# Patient Record
Sex: MALE | Race: BLACK OR AFRICAN AMERICAN | NOT HISPANIC OR LATINO | URBAN - METROPOLITAN AREA
[De-identification: names, ages, dates, MRNs, and addresses within clinical notes are randomized per-mention and may not be internally consistent; named-entity substitution may affect disease eponyms.]

---

## 2019-07-13 ENCOUNTER — OUTPATIENT (OUTPATIENT)
Dept: OUTPATIENT SERVICES | Facility: HOSPITAL | Age: 64
LOS: 1 days | Discharge: HOME | End: 2019-07-13

## 2019-07-13 DIAGNOSIS — N39.0 URINARY TRACT INFECTION, SITE NOT SPECIFIED: ICD-10-CM

## 2019-07-13 DIAGNOSIS — R79.1 ABNORMAL COAGULATION PROFILE: ICD-10-CM

## 2019-07-13 DIAGNOSIS — Z13.220 ENCOUNTER FOR SCREENING FOR LIPOID DISORDERS: ICD-10-CM

## 2019-07-13 DIAGNOSIS — Z00.00 ENCOUNTER FOR GENERAL ADULT MEDICAL EXAMINATION WITHOUT ABNORMAL FINDINGS: ICD-10-CM

## 2019-07-13 DIAGNOSIS — Z13.21 ENCOUNTER FOR SCREENING FOR NUTRITIONAL DISORDER: ICD-10-CM

## 2019-07-13 DIAGNOSIS — E11.9 TYPE 2 DIABETES MELLITUS WITHOUT COMPLICATIONS: ICD-10-CM

## 2019-07-13 DIAGNOSIS — Z13.0 ENCOUNTER FOR SCREENING FOR DISEASES OF THE BLOOD AND BLOOD-FORMING ORGANS AND CERTAIN DISORDERS INVOLVING THE IMMUNE MECHANISM: ICD-10-CM

## 2019-07-13 DIAGNOSIS — R97.0 ELEVATED CARCINOEMBRYONIC ANTIGEN [CEA]: ICD-10-CM

## 2019-07-13 DIAGNOSIS — R94.6 ABNORMAL RESULTS OF THYROID FUNCTION STUDIES: ICD-10-CM

## 2019-07-13 DIAGNOSIS — R79.89 OTHER SPECIFIED ABNORMAL FINDINGS OF BLOOD CHEMISTRY: ICD-10-CM

## 2019-07-13 DIAGNOSIS — R73.09 OTHER ABNORMAL GLUCOSE: ICD-10-CM

## 2019-11-23 ENCOUNTER — OUTPATIENT (OUTPATIENT)
Dept: OUTPATIENT SERVICES | Facility: HOSPITAL | Age: 64
LOS: 1 days | Discharge: HOME | End: 2019-11-23

## 2019-11-23 DIAGNOSIS — B50.9 PLASMODIUM FALCIPARUM MALARIA, UNSPECIFIED: ICD-10-CM

## 2019-11-23 DIAGNOSIS — R82.79 OTHER ABNORMAL FINDINGS ON MICROBIOLOGICAL EXAMINATION OF URINE: ICD-10-CM

## 2019-11-26 ENCOUNTER — INPATIENT (INPATIENT)
Facility: HOSPITAL | Age: 64
LOS: 2 days | Discharge: ANOTHER HOSPITAL | End: 2019-11-29
Attending: INTERNAL MEDICINE | Admitting: INTERNAL MEDICINE
Payer: COMMERCIAL

## 2019-11-26 VITALS
WEIGHT: 142.42 LBS | TEMPERATURE: 97 F | OXYGEN SATURATION: 100 % | DIASTOLIC BLOOD PRESSURE: 55 MMHG | HEART RATE: 39 BPM | RESPIRATION RATE: 18 BRPM | SYSTOLIC BLOOD PRESSURE: 102 MMHG | HEIGHT: 66 IN

## 2019-11-26 LAB
ALBUMIN SERPL ELPH-MCNC: 4.1 G/DL — SIGNIFICANT CHANGE UP (ref 3.5–5.2)
ALP SERPL-CCNC: 105 U/L — SIGNIFICANT CHANGE UP (ref 30–115)
ALT FLD-CCNC: 126 U/L — HIGH (ref 0–41)
ANION GAP SERPL CALC-SCNC: 16 MMOL/L — HIGH (ref 7–14)
APTT BLD: 34.5 SEC — SIGNIFICANT CHANGE UP (ref 27–39.2)
AST SERPL-CCNC: 348 U/L — HIGH (ref 0–41)
BASOPHILS # BLD AUTO: 0.02 K/UL — SIGNIFICANT CHANGE UP (ref 0–0.2)
BASOPHILS NFR BLD AUTO: 0.3 % — SIGNIFICANT CHANGE UP (ref 0–1)
BILIRUB SERPL-MCNC: 1.1 MG/DL — SIGNIFICANT CHANGE UP (ref 0.2–1.2)
BUN SERPL-MCNC: 23 MG/DL — HIGH (ref 10–20)
CALCIUM SERPL-MCNC: 8.6 MG/DL — SIGNIFICANT CHANGE UP (ref 8.5–10.1)
CHLORIDE SERPL-SCNC: 99 MMOL/L — SIGNIFICANT CHANGE UP (ref 98–110)
CK SERPL-CCNC: 1602 U/L — HIGH (ref 0–225)
CO2 SERPL-SCNC: 20 MMOL/L — SIGNIFICANT CHANGE UP (ref 17–32)
CREAT SERPL-MCNC: 1.4 MG/DL — SIGNIFICANT CHANGE UP (ref 0.7–1.5)
EOSINOPHIL # BLD AUTO: 0.04 K/UL — SIGNIFICANT CHANGE UP (ref 0–0.7)
EOSINOPHIL NFR BLD AUTO: 0.5 % — SIGNIFICANT CHANGE UP (ref 0–8)
GLUCOSE SERPL-MCNC: 132 MG/DL — HIGH (ref 70–99)
HCT VFR BLD CALC: 30.9 % — LOW (ref 42–52)
HGB BLD-MCNC: 9.7 G/DL — LOW (ref 14–18)
IMM GRANULOCYTES NFR BLD AUTO: 1 % — HIGH (ref 0.1–0.3)
INR BLD: 1.17 RATIO — SIGNIFICANT CHANGE UP (ref 0.65–1.3)
LACTATE SERPL-SCNC: 2.4 MMOL/L — HIGH (ref 0.5–2.2)
LDH SERPL L TO P-CCNC: 989 U/L — HIGH (ref 50–242)
LYMPHOCYTES # BLD AUTO: 0.99 K/UL — LOW (ref 1.2–3.4)
LYMPHOCYTES # BLD AUTO: 13 % — LOW (ref 20.5–51.1)
MAGNESIUM SERPL-MCNC: 2 MG/DL — SIGNIFICANT CHANGE UP (ref 1.8–2.4)
MCHC RBC-ENTMCNC: 30.4 PG — SIGNIFICANT CHANGE UP (ref 27–31)
MCHC RBC-ENTMCNC: 31.4 G/DL — LOW (ref 32–37)
MCV RBC AUTO: 96.9 FL — HIGH (ref 80–94)
MONOCYTES # BLD AUTO: 0.53 K/UL — SIGNIFICANT CHANGE UP (ref 0.1–0.6)
MONOCYTES NFR BLD AUTO: 7 % — SIGNIFICANT CHANGE UP (ref 1.7–9.3)
NEUTROPHILS # BLD AUTO: 5.96 K/UL — SIGNIFICANT CHANGE UP (ref 1.4–6.5)
NEUTROPHILS NFR BLD AUTO: 78.2 % — HIGH (ref 42.2–75.2)
NRBC # BLD: 0 /100 WBCS — SIGNIFICANT CHANGE UP (ref 0–0)
NT-PROBNP SERPL-SCNC: 3802 PG/ML — HIGH (ref 0–300)
PARASITE BLOOD SMEAR RESULT: SIGNIFICANT CHANGE UP
PARASITE BLOOD SMEAR RESULT: SIGNIFICANT CHANGE UP
PLATELET # BLD AUTO: 247 K/UL — SIGNIFICANT CHANGE UP (ref 130–400)
POTASSIUM SERPL-MCNC: 4.9 MMOL/L — SIGNIFICANT CHANGE UP (ref 3.5–5)
POTASSIUM SERPL-SCNC: 4.9 MMOL/L — SIGNIFICANT CHANGE UP (ref 3.5–5)
PROT SERPL-MCNC: 7.2 G/DL — SIGNIFICANT CHANGE UP (ref 6–8)
PROTHROM AB SERPL-ACNC: 13.4 SEC — HIGH (ref 9.95–12.87)
RBC # BLD: 3.19 M/UL — LOW (ref 4.7–6.1)
RBC # FLD: 12.4 % — SIGNIFICANT CHANGE UP (ref 11.5–14.5)
RETICS #: 36.3 K/UL — SIGNIFICANT CHANGE UP (ref 25–125)
RETICS/RBC NFR: 1.2 % — SIGNIFICANT CHANGE UP (ref 0.5–1.5)
SODIUM SERPL-SCNC: 135 MMOL/L — SIGNIFICANT CHANGE UP (ref 135–146)
TROPONIN T SERPL-MCNC: 10.98 NG/ML — CRITICAL HIGH
WBC # BLD: 7.62 K/UL — SIGNIFICANT CHANGE UP (ref 4.8–10.8)
WBC # FLD AUTO: 7.62 K/UL — SIGNIFICANT CHANGE UP (ref 4.8–10.8)

## 2019-11-26 PROCEDURE — 71045 X-RAY EXAM CHEST 1 VIEW: CPT | Mod: 26,77

## 2019-11-26 PROCEDURE — 99285 EMERGENCY DEPT VISIT HI MDM: CPT | Mod: 25

## 2019-11-26 PROCEDURE — 93308 TTE F-UP OR LMTD: CPT | Mod: 26

## 2019-11-26 PROCEDURE — 76705 ECHO EXAM OF ABDOMEN: CPT | Mod: 26

## 2019-11-26 PROCEDURE — 71045 X-RAY EXAM CHEST 1 VIEW: CPT | Mod: 26

## 2019-11-26 PROCEDURE — 93010 ELECTROCARDIOGRAM REPORT: CPT

## 2019-11-26 PROCEDURE — 93306 TTE W/DOPPLER COMPLETE: CPT | Mod: 26

## 2019-11-26 RX ORDER — ACETAMINOPHEN 500 MG
650 TABLET ORAL EVERY 6 HOURS
Refills: 0 | Status: DISCONTINUED | OUTPATIENT
Start: 2019-11-26 | End: 2019-11-29

## 2019-11-26 RX ORDER — ASPIRIN/CALCIUM CARB/MAGNESIUM 324 MG
325 TABLET ORAL ONCE
Refills: 0 | Status: COMPLETED | OUTPATIENT
Start: 2019-11-26 | End: 2019-11-26

## 2019-11-26 RX ORDER — SODIUM CHLORIDE 9 MG/ML
1000 INJECTION INTRAMUSCULAR; INTRAVENOUS; SUBCUTANEOUS
Refills: 0 | Status: DISCONTINUED | OUTPATIENT
Start: 2019-11-26 | End: 2019-11-26

## 2019-11-26 RX ORDER — CEFTRIAXONE 500 MG/1
1000 INJECTION, POWDER, FOR SOLUTION INTRAMUSCULAR; INTRAVENOUS ONCE
Refills: 0 | Status: COMPLETED | OUTPATIENT
Start: 2019-11-26 | End: 2019-11-26

## 2019-11-26 RX ORDER — CEFTRIAXONE 500 MG/1
2000 INJECTION, POWDER, FOR SOLUTION INTRAMUSCULAR; INTRAVENOUS EVERY 24 HOURS
Refills: 0 | Status: DISCONTINUED | OUTPATIENT
Start: 2019-11-27 | End: 2019-11-29

## 2019-11-26 RX ORDER — HEPARIN SODIUM 5000 [USP'U]/ML
5000 INJECTION INTRAVENOUS; SUBCUTANEOUS EVERY 8 HOURS
Refills: 0 | Status: DISCONTINUED | OUTPATIENT
Start: 2019-11-26 | End: 2019-11-29

## 2019-11-26 RX ORDER — CEFTRIAXONE 500 MG/1
INJECTION, POWDER, FOR SOLUTION INTRAMUSCULAR; INTRAVENOUS
Refills: 0 | Status: DISCONTINUED | OUTPATIENT
Start: 2019-11-26 | End: 2019-11-26

## 2019-11-26 RX ORDER — CLOPIDOGREL BISULFATE 75 MG/1
600 TABLET, FILM COATED ORAL ONCE
Refills: 0 | Status: COMPLETED | OUTPATIENT
Start: 2019-11-26 | End: 2019-11-26

## 2019-11-26 RX ORDER — FUROSEMIDE 40 MG
40 TABLET ORAL ONCE
Refills: 0 | Status: COMPLETED | OUTPATIENT
Start: 2019-11-26 | End: 2019-11-26

## 2019-11-26 RX ORDER — LANOLIN ALCOHOL/MO/W.PET/CERES
5 CREAM (GRAM) TOPICAL AT BEDTIME
Refills: 0 | Status: DISCONTINUED | OUTPATIENT
Start: 2019-11-26 | End: 2019-11-29

## 2019-11-26 RX ADMIN — Medication 650 MILLIGRAM(S): at 23:00

## 2019-11-26 RX ADMIN — CEFTRIAXONE 100 MILLIGRAM(S): 500 INJECTION, POWDER, FOR SOLUTION INTRAMUSCULAR; INTRAVENOUS at 22:44

## 2019-11-26 RX ADMIN — Medication 325 MILLIGRAM(S): at 09:18

## 2019-11-26 RX ADMIN — Medication 5 MILLIGRAM(S): at 22:44

## 2019-11-26 RX ADMIN — CLOPIDOGREL BISULFATE 600 MILLIGRAM(S): 75 TABLET, FILM COATED ORAL at 09:18

## 2019-11-26 RX ADMIN — HEPARIN SODIUM 5000 UNIT(S): 5000 INJECTION INTRAVENOUS; SUBCUTANEOUS at 21:36

## 2019-11-26 RX ADMIN — CEFTRIAXONE 100 MILLIGRAM(S): 500 INJECTION, POWDER, FOR SOLUTION INTRAMUSCULAR; INTRAVENOUS at 15:33

## 2019-11-26 RX ADMIN — Medication 650 MILLIGRAM(S): at 22:00

## 2019-11-26 NOTE — H&P ADULT - HISTORY OF PRESENT ILLNESS
62 yo male  PMHx: Htn, Malaria (Recently diagnosed on ATovaquin/Proguanil)  CC: SOB, palpitations    ER Course: Vitals on admission were /55, HR 39, T 97F, SpO2 100% on RA. EKG showed sinus rhythm with inferolateral ST elevation and PVCs. Bedside echo showed lateral wall hypokineses and pericardial effusion. Troponin was 10.98. Code STEMI called. Pt underwent emergent LHC. 62 yo male  PMHx: Htn, Malaria (Recently diagnosed on ATovaquin/Proguanil)  CC: SOB, palpitations    ER Course: Vitals on admission were /55, HR 39, T 97F, SpO2 100% on RA. EKG showed sinus rhythm with inferolateral ST elevation and PVCs. Bedside echo showed lateral wall hypokineses and pericardial effusion. Troponin was 10.98. Code STEMI called. Pt underwent emergent LHC. Normal coronaries seen. 64 yo male  PMHx: Htn, Malaria (Recently diagnosed on ATovaquin/Proguanil)  CC: SOB, fever, weakness  History dates back to 11/18 Monday when patient started feeling weak with intermittent fevers, chills and shivering. Pt previously was in [place] in the month of october and had been on Chemoprophylaxis with Mefloquine. Pt had been feeling fine during his trip.   Once patient came back, he was feeling weak. As per the wife, patient felt feverish, (fever not documented) which would get better with Tylenol and then pt would have similar symptoms after a few hours.   Pt went to see the family doctor on Friday had blood test done and the CXR done on Saturday and he was put on Atovaquine-Proguanil 4 times a day for 3 days, which he completed the course.   Today, pt was having worsening symptoms with dizziness/lightheadedness and nausea this AM but no vomiting.   Pt denied chest pain, dyspnea, palpitations, cough, urinary symptoms, headaches, hematuria, hematochezia, trauma, LOC.  ER Course: Vitals on admission were /55, HR 39, T 97F, SpO2 100% on RA. EKG showed sinus rhythm with inferolateral ST elevation and PVCs. Bedside echo showed lateral wall hypokineses and pericardial effusion. Troponin was 10.98. Code STEMI called. Pt underwent emergent LHC. Normal coronaries seen.

## 2019-11-26 NOTE — ED PROVIDER NOTE - PROGRESS NOTE DETAILS
TC: 62 yo M with hx of HTN and recently dx'ed malaria who presents with sob, generalized weakness, palpitations. ST elevations in inferolateral wall seen with reciprocal changes. STEMI code activated. Dr. Rich (cardio fellow) at bedside. TC: Dr. Rich to take to cath lab. Given ASA 325mg, plavix 600mg.

## 2019-11-26 NOTE — H&P ADULT - ATTENDING COMMENTS
Pt seen and examined independently in the CCU and case discussed with housestaff.  He has dyspnea which is better on oxygen.  + fever and chills  No chest pain, N/V/D/abdominal pain/rash  VS reviewed - febrile, tachy to 110's, pulse ox 100% on 2L NC  general - NAD, sitting on the side of the bed, thin, did not sleep last night  HEENT - anicteric, MMM  skin - no rash noted  chest - crackles at bases b/l  CVS - tachy  abdomen - soft, NT, ND  extremities - no edema  Neuro - awake, alert, cooperative    Labs reviewed    Troponin T, Serum in AM (11.27.19 @ 04:30)    Troponin T, Serum: 7.47: Critical value: ng/mL    Sedimentation Rate, Erythrocyte (11.27.19 @ 04:30)    Sedimentation Rate, Erythrocyte: 68 mm/Hr    Reticulocyte Count (11.26.19 @ 13:00)    Reticulocyte Percent: 1.2 %    Absolute Reticulocytes: 36.3 K/uL    < from: Transthoracic Echocardiogram (11.26.19 @ 12:52) >    Summary:   1. Left ventricular ejection fraction, by visual estimation, is 35 to   40%.   2. Elevated mean left atrial pressure.   3. Moderate concentric left ventricular hypertrophy.   4. Global stairn - 7.   5. Moderate pericardial effusion.   6. Best seen in subcostal view and anteriorly.   7. Thickening and calcification of the anterior mitral valve leaflet.   8. Mild-moderate tricuspid regurgitation.   9. PSAP at least 55.  10. Mild aortic regurgitation.  11. Sclerotic aortic valve with normal opening.    < end of copied text >    < from: Xray Chest 1 View- PORTABLE-Routine (11.27.19 @ 06:35) >    Cardiac/mediastinum/hilum: Stable.    Lung parenchyma/Pleura: There are unchanged bilateral reticular opacities    Skeleton/soft tissues: Stable    Impression:      Unchanged bilateral reticular opacities    < end of copied text >    Telemetry with NSVT    62 y/o man with PMH of HTN and recent treatment for presumed malaria (he went to [facility], took prophylaxis with mefloquine and felt well, developed fever and chills when [place] to the [place] and was given Atovaquine-Proguanil by PMD) came to the ED for dizziness and nausea. He was found with STEMI, underwent cardiac cath with normal coronaries and is now in the CCU for workup and management for nonischemic cardiomyopathy, myopericarditis with moderate pericardial effusion - rule out infectious etiology vs drug effect (mefloquine), suspected severe IVCD, acute HFrEF and transaminitis.    Appreciate ID, cardio and EP consults. Heart failure consult in progress.  CCU monitoring  repeat ECHO  Trend troponins  Agree with IV lasix 40mg x 1 now - pt with dyspnea, requires O2, crackles on exam and with reduced EF  Infectious workup per ID note - check another malaria smear (one is negative), HIV, hepatitis profile including Hep E, coxsackie, RMSF, typhoid, quantiferon gold, f/u blood cultures.  Very guarded prognosis  Full code status    I reviewed the resident's note and I agree with the history, physical exam, assessment and plan with additions as above.    PROGRESS NOTE HANDOFF    Pending: infectious workup for myopericarditis/pericardial effusion/fever in traveler, EP and Heart failure consults, ECHO    Disposition: to be determined, pt from home Pt seen and examined independently in the CCU and case discussed with housestaff.  He has dyspnea which is better on oxygen.  + fever and chills  No chest pain, N/V/D/abdominal pain/rash  VS reviewed - febrile, tachy to 110's, pulse ox 100% on 2L NC  general - NAD, sitting on the side of the bed, thin, did not sleep last night  HEENT - anicteric, MMM  skin - no rash noted  chest - crackles at bases b/l  CVS - tachy  abdomen - soft, NT, ND  extremities - no edema  Neuro - awake, alert, cooperative    Labs reviewed    Troponin T, Serum in AM (11.27.19 @ 04:30)    Troponin T, Serum: 7.47: Critical value: ng/mL    Sedimentation Rate, Erythrocyte (11.27.19 @ 04:30)    Sedimentation Rate, Erythrocyte: 68 mm/Hr    Reticulocyte Count (11.26.19 @ 13:00)    Reticulocyte Percent: 1.2 %    Absolute Reticulocytes: 36.3 K/uL    < from: Transthoracic Echocardiogram (11.26.19 @ 12:52) >    Summary:   1. Left ventricular ejection fraction, by visual estimation, is 35 to   40%.   2. Elevated mean left atrial pressure.   3. Moderate concentric left ventricular hypertrophy.   4. Global stairn - 7.   5. Moderate pericardial effusion.   6. Best seen in subcostal view and anteriorly.   7. Thickening and calcification of the anterior mitral valve leaflet.   8. Mild-moderate tricuspid regurgitation.   9. PSAP at least 55.  10. Mild aortic regurgitation.  11. Sclerotic aortic valve with normal opening.    < end of copied text >    < from: Xray Chest 1 View- PORTABLE-Routine (11.27.19 @ 06:35) >    Cardiac/mediastinum/hilum: Stable.    Lung parenchyma/Pleura: There are unchanged bilateral reticular opacities    Skeleton/soft tissues: Stable    Impression:      Unchanged bilateral reticular opacities    < end of copied text >    Telemetry with NSVT    64 y/o man with PMH of HTN and recent treatment for presumed malaria (he went to [facility], took prophylaxis with mefloquine and felt well, developed fever and chills when [place] the [place] and was given Atovaquine-Proguanil by PMD) came to the ED for dizziness and nausea. He was found with STEMI, underwent cardiac cath with normal coronaries and is now in the CCU for workup and management for nonischemic cardiomyopathy, myopericarditis with moderate pericardial effusion - rule out infectious etiology vs drug effect (mefloquine), suspected severe IVCD, acute HFrEF and transaminitis.    Appreciate ID, cardio and EP consults. Heart failure consult in progress.  CCU monitoring  repeat ECHO  Trend troponins  Agree with IV lasix 40mg x 1 now - pt with dyspnea, requires O2, crackles on exam and with reduced EF  Infectious workup per ID note - check another malaria smear (one is negative), HIV, hepatitis profile including Hep E, coxsackie, RMSF, typhoid, quantiferon gold, f/u blood cultures.  on ceftriaxone and doxycycline  Very guarded prognosis  Full code status    I reviewed the resident's note and I agree with the history, physical exam, assessment and plan with additions as above.    PROGRESS NOTE HANDOFF    Pending: infectious workup for myopericarditis/pericardial effusion/fever in traveler, EP and Heart failure consults, ECHO    Disposition: to be determined, pt from home

## 2019-11-26 NOTE — ED PROVIDER NOTE - PHYSICAL EXAMINATION
CONSTITUTIONAL: well developed, nontoxic appearing, in no acute distress, speaking in full sentences  SKIN: warm, dry, no rash, cap refill < 2 seconds  HEENT: normocephalic, atraumatic, no conjunctival erythema, moist mucous membranes, patent airway  NECK: supple, no masses  CV:  regular rate, regular rhythm, 2+ radial pulses bilaterally  RESP: no wheezes, no rales, no rhonchi, normal work of breathing  ABD: soft, nontender, nondistended, no rebound, no guarding  MSK: normal ROM, no cyanosis, no edema  NEURO: alert, oriented, grossly unremarkable  PSYCH: cooperative, appropriate

## 2019-11-26 NOTE — CONSULT NOTE ADULT - SUBJECTIVE AND OBJECTIVE BOX
KRISTAL WHEATLEY  63y, Male  Allergy: No Known Allergies      CHIEF COMPLAINT: SOB, fever, weakness (26 Nov 2019 09:52)      HPI:  62 yo M with HTN, recent travel to [place] 10/14-11/18 on mefloquine ppx that was started prior to travel, admitted with fever, weakness, SOB. Pt felt in his usual state of health in [place]. Traveled mostly in the cities. On Monday, 11/18 on the day of return, started to developed chills, fever. Pt denies HA, rhinorrhea, sore throat, cough, abd pain, diarrhea, n/v, dysuria, rash, arthralgias. He saw a physician on 11/23 who prescribed atov/proguinil (unclear if malaria testing performed), then pt continued to have symptoms and came to the ER.   Found to have diffuse ST elevation, trop 10, pericardial effusion, anemia. s/p cath without CAD.   Does not recall mosquito/bug bites.     PAST MEDICAL & SURGICAL HISTORY:  Malaria  Hypertension  No significant past surgical history      FAMILY HISTORY  No pertinent family history in first degree relatives      SOCIAL HISTORY    Substance Use ( x ) never used  (  ) IVDU (  ) Other:  Tobacco Usage:  ( x  ) never smoked   (   ) former smoker   (   ) current smoker   Alcohol Usage: (   ) social  (   ) daily use (   x) denies  Sexual History: na      ROS  General: as noted above   HEENT: Denies headache, rhinorrhea, sore throat, eye pain  CV: Denies CP, palpitations  PULM: Denies wheezing, hemoptysis  GI: Denies hematemesis, hematochezia, melena  : Denies discharge, hematuria  MSK: Denies arthralgias, myalgias  SKIN: Denies rash, lesions  NEURO: Denies paresthesias, weakness  PSYCH: Denies depression, anxiety    VITALS:  T(F): 97, Max: 97 (11-26-19 @ 08:59)  HR: 82  BP: 103/56  RR: 18Vital Signs Last 24 Hrs  T(C): 36.1 (26 Nov 2019 08:59), Max: 36.1 (26 Nov 2019 08:59)  T(F): 97 (26 Nov 2019 08:59), Max: 97 (26 Nov 2019 08:59)  HR: 82 (26 Nov 2019 09:13) (39 - 82)  BP: 103/56 (26 Nov 2019 09:13) (102/55 - 103/56)  BP(mean): --  RR: 18 (26 Nov 2019 09:13) (18 - 18)  SpO2: 100% (26 Nov 2019 09:13) (100% - 100%)    PHYSICAL EXAM:  Gen: NAD, on BIPAP  HEENT: Normocephalic, atraumatic  Neck: supple, no lymphadenopathy  CV: Regular rate & regular rhythm  Lungs: decreased BS at bases, no fremitus  Abdomen: Soft, BS present  Ext: Warm, well perfused  Neuro: non focal, awake  Skin: no rash, no erythema  Lines: no phlebitis    TESTS & MEASUREMENTS:                        9.7    7.62  )-----------( 247      ( 26 Nov 2019 09:20 )             30.9     11-26    135  |  99  |  23<H>  ----------------------------<  132<H>  4.9   |  20  |  1.4    Ca    8.6      26 Nov 2019 09:20  Mg     2.0     11-26    TPro  7.2  /  Alb  4.1  /  TBili  1.1  /  DBili  x   /  AST  348<H>  /  ALT  126<H>  /  AlkPhos  105  11-26    eGFR if Non African American: 53 mL/min/1.73M2 (11-26-19 @ 09:20)  eGFR if : 62 mL/min/1.73M2 (11-26-19 @ 09:20)    LIVER FUNCTIONS - ( 26 Nov 2019 09:20 )  Alb: 4.1 g/dL / Pro: 7.2 g/dL / ALK PHOS: 105 U/L / ALT: 126 U/L / AST: 348 U/L / GGT: x                 Lactate, Blood: 2.4 mmol/L (11-26-19 @ 13:00)      INFECTIOUS DISEASES TESTING      RADIOLOGY & ADDITIONAL TESTS:  I have personally reviewed the last Chest xray  CXR      CT      CARDIOLOGY TESTING  12 Lead ECG:   Ventricular Rate 83 BPM    Atrial Rate 83 BPM    P-R Interval 164 ms    QRS Duration 98 ms    Q-T Interval 374 ms    QTC Calculation(Bezet) 439 ms    P Axis 73 degrees    R Axis 84 degrees    T Axis 43 degrees    Diagnosis Line Undetermined rhythm  Anteroseptal infarct , possibly acute  Inferolateral injury pattern  ** ** ACUTE MI / STEMI ** **  Abnormal ECG    NOTIFICATION - Please notify MD and re-edit. On re-edit indicate name of  physician and date/time notified.  Confirmed by SARAH WELLS MD ([id number]) on 11/26/2019 2:04:26 PM (11-26-19 @ 09:15)  12 Lead ECG:   Ventricular Rate 78 BPM    Atrial Rate 78 BPM    P-R Interval 160 ms    QRS Duration 102 ms    Q-T Interval 374 ms    QTC Calculation(Bezet) 426 ms    P Axis 76 degrees    R Axis 98 degrees    T Axis 49 degrees    Diagnosis Line Sinus rhythm with frequent Premature ventricular complexes in a pattern of  bigeminy  Right atrial enlargement  Rightward axis  Pulmonary disease pattern  ST elevation consider anterolateral injury or acute infarct  ST elevation consider inferior injury or acute infarct  ** ** ACUTE MI / STEMI ** **  NOTIFICATION - Please notify MD and re-edit. On re-edit indicate name of  physician and date/time notified.  Abnormal ECG    Confirmed by SARAH WELLS MD ([id number]) on 11/26/2019 2:04:20 PM (11-26-19 @ 09:05)      MEDICATIONS  cefTRIAXone   IVPB 1000  cefTRIAXone   IVPB   furosemide   Injectable 40  heparin  Injectable 5000      ANTIBIOTICS:  cefTRIAXone   IVPB 1000 milliGRAM(s) IV Intermittent once  cefTRIAXone   IVPB          ALLERGIES:  No Known Allergies

## 2019-11-26 NOTE — ED ADULT NURSE NOTE - CHIEF COMPLAINT QUOTE
hot and cold sweats during the night and when he wakes up, feels like its hard to breath, patient feeling like this since coming back from [place] in October, states his doctor thinks it might be Malaria

## 2019-11-26 NOTE — ED ADULT TRIAGE NOTE - CHIEF COMPLAINT QUOTE
hot and cold sweats during the night and when he wakes up, feels like its hard to breath, patient feeling like this since coming back from [place], states his doctor thinks it might be Malaria hot and cold sweats during the night and when he wakes up, feels like its hard to breath, patient feeling like this since coming back from [place] in October, states his doctor thinks it might be Malaria

## 2019-11-26 NOTE — CONSULT NOTE ADULT - SUBJECTIVE AND OBJECTIVE BOX
Date of Admission: 11-26-19    CHIEF COMPLAINT: Patient is a 63y old  Male who presents with a chief complaint of SOB    HPI: 64 y/o M with PMH of HTN, recent diagnosis Malaria actively treated with medications presented with persistent SOB and palpitations that started at 4 am. Pt was started on Malaria medications 4 days ago. Since pt has been taking medication, pt admits to having intermitted SOB. Pt recently came back from [place] and diagnosed with Malaria.    Bedside Echo showed lateral wall hypokineses and pericardial effusion    PAST MEDICAL & SURGICAL HISTORY:  Hypertension    FAMILY HISTORY:  [x] no pertinent family history of premature cardiovascular disease in first degree relatives.    SOCIAL HISTORY:    [x] Non-smoker  [x] No alcohol use  [x] No illicit drug use    Allergies: No Known Allergies  	    REVIEW OF SYSTEMS:  CONSTITUTIONAL: No fever, weight loss, or fatigue  CARDIOLOGY: (+) Palpitations and dyspnea of exertion. No chest pain,  or syncopal episodes.   RESPIRATORY: (+) Shortness of breath, cough, wheezing.   NEUROLOGICAL: No weakness, no focal deficits to report.  GI: No BRBPR, no N,V,diarrhea.    PSYCHIATRY: Normal mood and affect.  HEENT: No nasal discharge, no ecchymosis  SKIN: No ecchymosis, no breakdown  MUSCULOSKELETAL: Full range of motion x4.   EXTREM: No leg swelling or erythema.    PHYSICAL EXAM:  T(C): 36.1 (11-26-19 @ 08:59), Max: 36.1 (11-26-19 @ 08:59)  HR: 82 (11-26-19 @ 09:13) (39 - 82)  BP: 103/56 (11-26-19 @ 09:13) (102/55 - 103/56)  RR: 18 (11-26-19 @ 09:13) (18 - 18)  SpO2: 100% (11-26-19 @ 09:13) (100% - 100%)  Wt(kg): --  I&O's Summary      General Appearance: Well appearing, normal for age and gender. 	  Neck: Normal JVP, no bruit.   Eyes: No xanthomalasia, Extra Ocular muscles intact.   Cardiovascular: Regular rate and rhythm S1 S2, No JVD, No murmurs.  Respiratory: Lungs clear to auscultation. No wheezes, rales or rhonchi.  Psychiatry: Alert and oriented x 3, Mood & affect appropriate  Gastrointestinal:  Soft, Non-tender  Skin/Integumen: No rashes, No ecchymoses, No cyanosis	  Neurologic: Non-focal deficits.  Musculoskeletal/ extremities: Normal range of motion, No clubbing, cyanosis or edema  Vascular: Peripheral pulses palpable 2+ bilaterally    LABS:	 	                        9.7    7.62  )-----------( 247      ( 26 Nov 2019 09:20 )             30.9       PT/INR - ( 26 Nov 2019 09:20 )   PT: 13.40 sec;   INR: 1.17 ratio    PTT - ( 26 Nov 2019 09:20 )  PTT:34.5 sec        TELEMETRY EVENTS: 	    ECG:  sinus rhythm with inferolateral ST elevation and PVCs	  RADIOLOGY:  OTHER: 	    PREVIOUS DIAGNOSTIC TESTING:    [ ] Echocardiogram:   [ ] Catheterization:  [ ] Stress Test:  	  	  Home Medications:   Atovaquone and proguanil 250 mg/100mg  BP medications    MEDICATIONS  (STANDING):    MEDICATIONS  (PRN):

## 2019-11-26 NOTE — CHART NOTE - NSCHARTNOTEFT_GEN_A_CORE
Preliminary Cardiac Catheterization Post-Procedure Report:11-26-19 @ 10:26    After risks, benefits and alternatives of the procedure were explained, consent was signed and placed in the medical record prior to initiation of procedure.  Procedural timeout was performed prior to initiation of procedure in presence of cath lab staff. Cardiac catheterization was performed without any significant complications. Post-procedure vital signs were stable.    Procedure Performed:  [x ] Left Heart Catheterization  [ ] Right Heart Catheterization  [ ] Percutaneous Coronary Intervention    Primary Physician: Dr. Ida MD  Assistant(s): Dr. Josemanuel MD and  Dr. Daysi MD    Preliminary Procedure Summary (Official full report to follow)    Pre-procedure diagnosis: STEMI  Post Procedure Diagnosis/Impression: normal coronaries    Left Heart Catheterization:  approximate EF%: n/a  [x] Normal Coronary Arteries  [ ] Luminal Irregularities  [ ] non-obstructive CAD  [ ] ** vessel coronary artery disease       Anesthesia Type  [x] conscious sedation  [x] local/regional anesthesia  [  ] general anesthesia    Estimated Blood Loss  [x ] less than 30 ml    Amount of Contrast used:  40 ml    Access  [x ] Rt. Femoral A --> manual compression.  [ ] Rt. Femoral V  [ ] Rt. Radial A  [ ] Rt. Brachial V    Condition of patient after procedure  [x] stable  [  ] guarded  [  ] satisfactory     CATH SUMMARY/FINDINGS:  Coronary circulation: The coronary circulation is right dominant. Otherwise normal coronary arteries. Left main: Angiography showed no evidence of disease. LAD: Angiography showed no evidence of disease. Circumflex: Angiography showed no evidence of disease. RCA: The vessel was medium sized. Angiography showed no evidence of disease. Right PDA: Angiography showed no evidence of disease.     Specimens obtained: n/a    Implants: n/a    Follow-up Care:  [ ] D/C Home today  [x ] Return to In-patient bed  [ ] Admit to:  [ ] Return for staged procedure:  [ ] CT Surgery consult called  [ ] DAPT, statin, BB, ACEI  [x ] intensive medical management  [ ] ** EPS/nephrology evaluation requested

## 2019-11-26 NOTE — ED PROVIDER NOTE - OBJECTIVE STATEMENT
62 yo M with PMHx of HTN who presents with gradual onset of constant, moderate, sob associated with generalized weakness, fever, chills, night sweats, palpitations x 4 days. No alleviating/aggravating factors. Pt returned from [place] on 11/18/19 and went to PMD 3 days ago. Had cxr and was started on atovaquone/proguanil for malaria. Since then sx have been persistent so came to ED today. In ED found to have STEMI in inferolateral wall. Denies cp, leg swelling, calf pain, hx of clots, cancer, hormone use. No hx of MI.

## 2019-11-26 NOTE — H&P ADULT - NSHPPHYSICALEXAM_GEN_ALL_CORE
PHYSICAL EXAM:  GENERAL: NAD, speaks in full sentences, no signs of respiratory distress  HEAD:  Atraumatic, Normocephalic  EYES: EOMI, PERRLA, conjunctiva and sclera clear  NECK: Supple, No JVD  CHEST/LUNG: Clear to auscultation bilaterally; No wheeze; No crackles; No accessory muscles used  HEART: Regular rate and rhythm; No murmurs;   ABDOMEN: Soft, Nontender, Nondistended; Bowel sounds present; No guarding  EXTREMITIES:  2+ Peripheral Pulses, No cyanosis or edema  PSYCH: AAOx3  NEUROLOGY: non-focal  SKIN: No rashes or lesions PHYSICAL EXAM:  GENERAL: On Bipap  HEAD:  Atraumatic, Normocephalic  EYES:  PERRLA  NECK: Supple, No JVD  CHEST/LUNG: ; No wheeze; No crackles; No accessory muscles used  HEART: Regular rate and rhythm; No murmurs;   ABDOMEN: Soft, Nontender, Nondistended; Bowel sounds present; No guarding  EXTREMITIES:  2+ Peripheral Pulses, No cyanosis or edema  PSYCH: AAOx3  NEUROLOGY: non-focal  SKIN: No rashes or lesions

## 2019-11-26 NOTE — ED PROVIDER NOTE - NS ED ROS FT
GEN:  + fever, + chills, + generalized weakness  NEURO:  no headache, no dizziness  ENT: no sore throat, no runny nose  CV:  no chest pain, + palpitations  RESP:  + sob, no orthopnea  GI:  no nausea, no vomiting, no abdominal pain, no diarrhea  :  no dysuria, no urinary frequency, no hematuria  MSK:  no joint pain, no edema  SKIN:  no rash, no bruising  HEME: no hematochezia, no melena

## 2019-11-26 NOTE — ED PROCEDURE NOTE - US DIAGNOSIS
Called patient to schedule PFO closure with Dr Les WILKINS for call back   Pericardial Effusion/Other/lateral wall hypokinesis

## 2019-11-26 NOTE — H&P ADULT - ASSESSMENT
62 yo male with PMHx of HTN and malaria, presented with SOB and palpitations.     #SOB/Palpitations with Acute STEMI  -EKG: Sinus rhythm with inferolateral ST elevations and PVCs; Troponin 10.98; CXR unremarkable  -S/P Emergent LHC  -F/u 2D echo 64 yo male with PMHx of HTN and malaria, presented with SOB and palpitations.     #SOB/Palpitations with Acute STEMI  -EKG: Sinus rhythm with inferolateral ST elevations and PVCs; Troponin 10.98; CXR unremarkable  -S/P Emergent LHC  -2D echo, trend CE; F/u lipid profile,HbA1c, TSH  -C/w asa 81, plavix 75mg, metoprolol 12.5mg bid, high intensity statin    #Malaria-c/w Atovaquin and Proguanil; ID follow up  #HTN-c/w     #DVT ppx: Lovenox  #GI ppx: Not indicated  #Diet: NPO for cath; DASH diet post cath  #Activity: IAT  #FULL CODE  #DIspo: CCU 62 yo male with PMHx of HTN and malaria, presented with SOB and palpitations.     #SOB/Palpitations with Acute STEMI  -EKG: Sinus rhythm with inferolateral ST elevations and PVCs; Troponin 10.98; CXR unremarkable  -S/P Emergent LHC- Normal coronaries.   -2D echo, trend CE; F/u lipid profile,HbA1c, TSH  -C/w asa 81, plavix 75mg, metoprolol 12.5mg bid, high intensity statin    #Malaria-c/w Atovaquin and Proguanil; ID follow up  #HTN-c/w     #DVT ppx: Lovenox  #GI ppx: Not indicated  #Diet: NPO for cath; DASH diet post cath  #Activity: IAT  #FULL CODE  #DIspo: CCU 64 yo male with PMHx of HTN and malaria, presented with SOB and palpitations.     #SOB/Palpitations with Acute STEMI  -EKG: Sinus rhythm with inferolateral ST elevations and PVCs; Troponin 10.98; CXR unremarkable  -S/P Emergent LHC- Normal coronaries.   -F/U 2D echo, trend CE; F/u lipid profile,HbA1c, TSH    #Malaria-c/w Atovaquin and Proguanil; ID follow up  #HTN-c/w     #DVT ppx: Lovenox  #GI ppx: Not indicated  #Diet: NPO for cath; DASH diet post cath  #Activity: IAT  #FULL CODE  #DIspo: CCU 64 yo male with PMHx of HTN and malaria, presented with SOB and palpitations.     #Weakness, cyclical fevers, Dyspnea  -EKG: Sinus rhythm with inferolateral ST elevations and PVCs; Troponin 10.98; CXR unremarkable  -S/P Emergent LHC- Normal coronaries.   -F/U 2D echo, trend CE; F/u lipid profile,HbA1c, TSH    #Malaria-c/w Atovaquin and Proguanil; ID follow up  #HTN-c/w     #DVT ppx: Lovenox  #GI ppx: Not indicated  #Diet: NPO for cath; DASH diet post cath  #Activity: IAT  #FULL CODE  #DIspo: CCU 62 yo male with PMHx of HTN and malaria, presented with SOB and palpitations.     #Weakness, cyclical fevers, Dyspnea -r/o myopericarditis of infectious etiology  -EKG: Sinus rhythm with inferolateral ST elevations and PVCs; Troponin 10.98; CXR unremarkable  -S/P Emergent LHC- Normal coronaries.   -F/U 2D echo, trend CE; F/u lipid profile,HbA1c, TSH  -Rule out malaria as the possible infectious cause. F/U peripheral blood smear for parasites  -f/u hemolysis panel, retic count, LDH, ESR, Blood cx  -ID follow up.     #HTN- Monitor for now; DASH diet    #DVT ppx: Heparin sq  #GI ppx: Not indicated  #Diet: NPO for cath; DASH diet post cath  #Activity: IAT  #FULL CODE  #DIspo: CCU

## 2019-11-26 NOTE — ED PROVIDER NOTE - ST/T WAVE
ST elevation in II, III, aVF, V4-V6. ST depression in V1-V3. ST elevation in II, III, aVF, V4-V6. ST depression in Vi-V3.

## 2019-11-26 NOTE — ED PROVIDER NOTE - ATTENDING CONTRIBUTION TO CARE
64 y/o male with hx of HTN, currently being treated for malaria. c/o SOB since 4 AM. Began after coming out of the shower. No chest pain. No cough. No fever. Non-pleuritic. No hemoptysis.  O/E: Constitutional: Mildly uncomfortable. Eyes: No pallor, no jaundice. Respiratory: Lungs CTA and equal b/l. Cardio: S1 S2 regular, no murmur. Equal pulses in all extremities. ABD: ABD soft, no tenderness. Extremities: No pedal edema, no calf tenderness. Skin: No skin rash. Neuro: No neurologic deficits.   EKG: sinus, ST elevations II, III, aVF, V4-V6, ST depressions with R waves V1-V3.  Imp: Infero-postero-lateral STEMI.  A/P: Code STEMI, ASA, plavix. To cath lab.

## 2019-11-26 NOTE — H&P ADULT - NSHPLABSRESULTS_GEN_ALL_CORE
9.7    7.62  )-----------( 247      ( 26 Nov 2019 09:20 )             30.9       11-26    135  |  99  |  23<H>  ----------------------------<  132<H>  4.9   |  20  |  1.4    Ca    8.6      26 Nov 2019 09:20  Mg     2.0     11-26    TPro  7.2  /  Alb  4.1  /  TBili  1.1  /  DBili  x   /  AST  348<H>  /  ALT  126<H>  /  AlkPhos  105  11-26    CARDIAC MARKERS ( 26 Nov 2019 09:20 )  x     / 10.98 ng/mL / x     / x     / x        PT/INR - ( 26 Nov 2019 09:20 )   PT: 13.40 sec;   INR: 1.17 ratio    PTT - ( 26 Nov 2019 09:20 )  PTT:34.5 sec    EKG: Sinus rhythm with inferolateral ST elevations and PVCs    < from: Xray Chest 1 View-PORTABLE IMMEDIATE (11.26.19 @ 09:35) >    Impression:    No radiographic evidence of acute pulmonary disease.  Enlarged cardiac silhouette.

## 2019-11-26 NOTE — CONSULT NOTE ADULT - ASSESSMENT
ASSESSMENT  62 yo M with HTN, recent travel to [place] 10/14-11/18 on mefloquine ppx that was started prior to travel, admitted with fever, weakness, SOB, found to have ST elevations, trop of 10 , s/p normal cath     IMPRESSION  #Elevated Trop/ST elevations with pericardial effusion c/w myopericarditis    unclear if 2/2 malaria, though mefloquine should have been sufficient as chemoprophylaxis if pt was adherent VS.    Possibly a side effect of mefloquine as can cause pericarditis and other cardiac side effects    R/o typhoid    No URI sx to suggest a viral etiology  #Sepsis ruled out on admission     afebrile since admission    CXR clear  #Anemia borderline macrocytic  #Lactic acidosis    RECOMMENDATIONS  - call Dr. Simmons to see if malaria testing was performed as outpatient   - send parasite smear and parasitemia  - send blood cultures  - Ceftriaxone 2g q24h IV, d/c if BCX NG  - repeat LDH (hemolyzed)  - HIV ab/ag CMIA, quantiferon gold   - ESR, CRP   - If + parasite smear---> would start IV artesunate will need to call [facility] hotline    Spectra [phone]

## 2019-11-26 NOTE — H&P ADULT - NSHPREVIEWOFSYSTEMS_GEN_ALL_CORE
REVIEW OF SYSTEMS    CONSTITUTIONAL: No weakness, fevers or chills  RESPIRATORY: No cough, wheezing, hemoptysis; No shortness of breath  CARDIOVASCULAR: No chest pain; + palpitations  GASTROINTESTINAL: No abdominal or epigastric pain. No nausea, vomiting, or hematemesis; No diarrhea or constipation. No melena or hematochezia.  GENITOURINARY: No dysuria, frequency or hematuria  NEUROLOGICAL: No numbness or weakness  SKIN: No itching, rashes    Vital Signs Last 24 Hrs  T(C): 36.1 (26 Nov 2019 08:59), Max: 36.1 (26 Nov 2019 08:59)  T(F): 97 (26 Nov 2019 08:59), Max: 97 (26 Nov 2019 08:59)  HR: 82 (26 Nov 2019 09:13) (39 - 82)  BP: 103/56 (26 Nov 2019 09:13) (102/55 - 103/56)  RR: 18 (26 Nov 2019 09:13) (18 - 18)  SpO2: 100% (26 Nov 2019 09:13) (100% - 100%) REVIEW OF SYSTEMS    CONSTITUTIONAL: + Weakness, fevers and chills  RESPIRATORY: No cough, wheezing, hemoptysis; Mild shortness of breath  CARDIOVASCULAR: No chest pain; No palpitations  GASTROINTESTINAL: No abdominal or epigastric pain. No nausea, vomiting, or hematemesis; No diarrhea or constipation. No melena or hematochezia.  GENITOURINARY: No dysuria, frequency or hematuria  NEUROLOGICAL: No numbness or weakness  SKIN: No itching, rashes    Vital Signs Last 24 Hrs  T(C): 36.1 (26 Nov 2019 08:59), Max: 36.1 (26 Nov 2019 08:59)  T(F): 97 (26 Nov 2019 08:59), Max: 97 (26 Nov 2019 08:59)  HR: 82 (26 Nov 2019 09:13) (39 - 82)  BP: 103/56 (26 Nov 2019 09:13) (102/55 - 103/56)  RR: 18 (26 Nov 2019 09:13) (18 - 18)  SpO2: 100% (26 Nov 2019 09:13) (100% - 100%)

## 2019-11-27 DIAGNOSIS — I49.9 CARDIAC ARRHYTHMIA, UNSPECIFIED: ICD-10-CM

## 2019-11-27 DIAGNOSIS — I31.9 DISEASE OF PERICARDIUM, UNSPECIFIED: ICD-10-CM

## 2019-11-27 DIAGNOSIS — I51.9 HEART DISEASE, UNSPECIFIED: ICD-10-CM

## 2019-11-27 LAB
ALBUMIN SERPL ELPH-MCNC: 3.8 G/DL — SIGNIFICANT CHANGE UP (ref 3.5–5.2)
ALP SERPL-CCNC: 115 U/L — SIGNIFICANT CHANGE UP (ref 30–115)
ALT FLD-CCNC: 920 U/L — HIGH (ref 0–41)
ANION GAP SERPL CALC-SCNC: 15 MMOL/L — HIGH (ref 7–14)
AST SERPL-CCNC: 946 U/L — HIGH (ref 0–41)
BASOPHILS # BLD AUTO: 0.04 K/UL — SIGNIFICANT CHANGE UP (ref 0–0.2)
BASOPHILS NFR BLD AUTO: 0.3 % — SIGNIFICANT CHANGE UP (ref 0–1)
BILIRUB SERPL-MCNC: 1 MG/DL — SIGNIFICANT CHANGE UP (ref 0.2–1.2)
BUN SERPL-MCNC: 20 MG/DL — SIGNIFICANT CHANGE UP (ref 10–20)
CALCIUM SERPL-MCNC: 8.5 MG/DL — SIGNIFICANT CHANGE UP (ref 8.5–10.1)
CHLORIDE SERPL-SCNC: 101 MMOL/L — SIGNIFICANT CHANGE UP (ref 98–110)
CHOLEST SERPL-MCNC: 170 MG/DL — SIGNIFICANT CHANGE UP (ref 100–200)
CO2 SERPL-SCNC: 20 MMOL/L — SIGNIFICANT CHANGE UP (ref 17–32)
CREAT SERPL-MCNC: 1.1 MG/DL — SIGNIFICANT CHANGE UP (ref 0.7–1.5)
CRP SERPL-MCNC: 7.19 MG/DL — HIGH (ref 0–0.4)
CRP SERPL-MCNC: 9.22 MG/DL — HIGH (ref 0–0.4)
EOSINOPHIL # BLD AUTO: 0.01 K/UL — SIGNIFICANT CHANGE UP (ref 0–0.7)
EOSINOPHIL NFR BLD AUTO: 0.1 % — SIGNIFICANT CHANGE UP (ref 0–8)
ERYTHROCYTE [SEDIMENTATION RATE] IN BLOOD: 68 MM/HR — HIGH (ref 0–10)
ERYTHROCYTE [SEDIMENTATION RATE] IN BLOOD: 70 MM/HR — HIGH (ref 0–10)
ESTIMATED AVERAGE GLUCOSE: 97 MG/DL — SIGNIFICANT CHANGE UP (ref 68–114)
GLUCOSE SERPL-MCNC: 130 MG/DL — HIGH (ref 70–99)
HBA1C BLD-MCNC: 5 % — SIGNIFICANT CHANGE UP (ref 4–5.6)
HCT VFR BLD CALC: 32.6 % — LOW (ref 42–52)
HCV AB S/CO SERPL IA: 0.1 S/CO — SIGNIFICANT CHANGE UP (ref 0–0.99)
HCV AB SERPL-IMP: SIGNIFICANT CHANGE UP
HDLC SERPL-MCNC: 39 MG/DL — LOW
HGB BLD-MCNC: 10.2 G/DL — LOW (ref 14–18)
HIV 1+2 AB+HIV1 P24 AG SERPL QL IA: SIGNIFICANT CHANGE UP
HIV 1+2 AB+HIV1 P24 AG SERPL QL IA: SIGNIFICANT CHANGE UP
IMM GRANULOCYTES NFR BLD AUTO: 1.3 % — HIGH (ref 0.1–0.3)
LACTATE SERPL-SCNC: 3.3 MMOL/L — HIGH (ref 0.7–2)
LDH SERPL L TO P-CCNC: 1424 U/L — HIGH (ref 50–242)
LIPID PNL WITH DIRECT LDL SERPL: 120 MG/DL — SIGNIFICANT CHANGE UP (ref 4–129)
LYMPHOCYTES # BLD AUTO: 1.86 K/UL — SIGNIFICANT CHANGE UP (ref 1.2–3.4)
LYMPHOCYTES # BLD AUTO: 13.8 % — LOW (ref 20.5–51.1)
MAGNESIUM SERPL-MCNC: 2.1 MG/DL — SIGNIFICANT CHANGE UP (ref 1.8–2.4)
MCHC RBC-ENTMCNC: 30 PG — SIGNIFICANT CHANGE UP (ref 27–31)
MCHC RBC-ENTMCNC: 31.3 G/DL — LOW (ref 32–37)
MCV RBC AUTO: 95.9 FL — HIGH (ref 80–94)
MONOCYTES # BLD AUTO: 1.09 K/UL — HIGH (ref 0.1–0.6)
MONOCYTES NFR BLD AUTO: 8.1 % — SIGNIFICANT CHANGE UP (ref 1.7–9.3)
NEUTROPHILS # BLD AUTO: 10.27 K/UL — HIGH (ref 1.4–6.5)
NEUTROPHILS NFR BLD AUTO: 76.4 % — HIGH (ref 42.2–75.2)
NRBC # BLD: 0 /100 WBCS — SIGNIFICANT CHANGE UP (ref 0–0)
PLATELET # BLD AUTO: 315 K/UL — SIGNIFICANT CHANGE UP (ref 130–400)
POTASSIUM SERPL-MCNC: 5.3 MMOL/L — HIGH (ref 3.5–5)
POTASSIUM SERPL-SCNC: 5.3 MMOL/L — HIGH (ref 3.5–5)
PROT SERPL-MCNC: 7.2 G/DL — SIGNIFICANT CHANGE UP (ref 6–8)
RBC # BLD: 3.4 M/UL — LOW (ref 4.7–6.1)
RBC # FLD: 12.6 % — SIGNIFICANT CHANGE UP (ref 11.5–14.5)
SODIUM SERPL-SCNC: 136 MMOL/L — SIGNIFICANT CHANGE UP (ref 135–146)
TOTAL CHOLESTEROL/HDL RATIO MEASUREMENT: 4.4 RATIO — SIGNIFICANT CHANGE UP (ref 4–5.5)
TRIGL SERPL-MCNC: 92 MG/DL — SIGNIFICANT CHANGE UP (ref 10–149)
TROPONIN T SERPL-MCNC: 7.47 NG/ML — CRITICAL HIGH
WBC # BLD: 13.44 K/UL — HIGH (ref 4.8–10.8)
WBC # FLD AUTO: 13.44 K/UL — HIGH (ref 4.8–10.8)

## 2019-11-27 PROCEDURE — 99222 1ST HOSP IP/OBS MODERATE 55: CPT

## 2019-11-27 PROCEDURE — 71045 X-RAY EXAM CHEST 1 VIEW: CPT | Mod: 26

## 2019-11-27 PROCEDURE — 93010 ELECTROCARDIOGRAM REPORT: CPT

## 2019-11-27 PROCEDURE — 99223 1ST HOSP IP/OBS HIGH 75: CPT | Mod: AI

## 2019-11-27 PROCEDURE — 99223 1ST HOSP IP/OBS HIGH 75: CPT

## 2019-11-27 RX ORDER — ARGININE 700 MG
1 CAPSULE ORAL
Qty: 0 | Refills: 0 | DISCHARGE

## 2019-11-27 RX ORDER — ATOVAQUONE/PROGUANIL HCL 250-100 MG
0 TABLET ORAL
Qty: 0 | Refills: 0 | DISCHARGE

## 2019-11-27 RX ORDER — SODIUM CHLORIDE 9 MG/ML
500 INJECTION, SOLUTION INTRAVENOUS ONCE
Refills: 0 | Status: COMPLETED | OUTPATIENT
Start: 2019-11-27 | End: 2019-11-27

## 2019-11-27 RX ORDER — VANCOMYCIN HCL 1 G
1000 VIAL (EA) INTRAVENOUS EVERY 12 HOURS
Refills: 0 | Status: DISCONTINUED | OUTPATIENT
Start: 2019-11-28 | End: 2019-11-29

## 2019-11-27 RX ORDER — VANCOMYCIN HCL 1 G
VIAL (EA) INTRAVENOUS
Refills: 0 | Status: DISCONTINUED | OUTPATIENT
Start: 2019-11-27 | End: 2019-11-29

## 2019-11-27 RX ORDER — VANCOMYCIN HCL 1 G
1000 VIAL (EA) INTRAVENOUS EVERY 12 HOURS
Refills: 0 | Status: DISCONTINUED | OUTPATIENT
Start: 2019-11-27 | End: 2019-11-27

## 2019-11-27 RX ORDER — ONDANSETRON 8 MG/1
4 TABLET, FILM COATED ORAL ONCE
Refills: 0 | Status: COMPLETED | OUTPATIENT
Start: 2019-11-27 | End: 2019-11-28

## 2019-11-27 RX ORDER — CHLORHEXIDINE GLUCONATE 213 G/1000ML
1 SOLUTION TOPICAL
Refills: 0 | Status: DISCONTINUED | OUTPATIENT
Start: 2019-11-27 | End: 2019-11-29

## 2019-11-27 RX ORDER — SODIUM CHLORIDE 9 MG/ML
1000 INJECTION, SOLUTION INTRAVENOUS
Refills: 0 | Status: DISCONTINUED | OUTPATIENT
Start: 2019-11-27 | End: 2019-11-28

## 2019-11-27 RX ORDER — VANCOMYCIN HCL 1 G
1000 VIAL (EA) INTRAVENOUS ONCE
Refills: 0 | Status: COMPLETED | OUTPATIENT
Start: 2019-11-27 | End: 2019-11-27

## 2019-11-27 RX ADMIN — Medication 250 MILLIGRAM(S): at 18:36

## 2019-11-27 RX ADMIN — Medication 650 MILLIGRAM(S): at 06:52

## 2019-11-27 RX ADMIN — Medication 650 MILLIGRAM(S): at 18:27

## 2019-11-27 RX ADMIN — HEPARIN SODIUM 5000 UNIT(S): 5000 INJECTION INTRAVENOUS; SUBCUTANEOUS at 06:20

## 2019-11-27 RX ADMIN — Medication 650 MILLIGRAM(S): at 17:17

## 2019-11-27 RX ADMIN — SODIUM CHLORIDE 100 MILLILITER(S): 9 INJECTION, SOLUTION INTRAVENOUS at 20:40

## 2019-11-27 RX ADMIN — CEFTRIAXONE 100 MILLIGRAM(S): 500 INJECTION, POWDER, FOR SOLUTION INTRAMUSCULAR; INTRAVENOUS at 22:26

## 2019-11-27 RX ADMIN — CHLORHEXIDINE GLUCONATE 1 APPLICATION(S): 213 SOLUTION TOPICAL at 07:20

## 2019-11-27 RX ADMIN — HEPARIN SODIUM 5000 UNIT(S): 5000 INJECTION INTRAVENOUS; SUBCUTANEOUS at 22:25

## 2019-11-27 RX ADMIN — Medication 5 MILLIGRAM(S): at 22:25

## 2019-11-27 RX ADMIN — SODIUM CHLORIDE 1000 MILLILITER(S): 9 INJECTION, SOLUTION INTRAVENOUS at 17:40

## 2019-11-27 RX ADMIN — HEPARIN SODIUM 5000 UNIT(S): 5000 INJECTION INTRAVENOUS; SUBCUTANEOUS at 14:24

## 2019-11-27 RX ADMIN — Medication 110 MILLIGRAM(S): at 08:22

## 2019-11-27 RX ADMIN — Medication 110 MILLIGRAM(S): at 17:16

## 2019-11-27 NOTE — PROGRESS NOTE ADULT - ASSESSMENT
ASSESSMENT  64 yo M with HTN, recent travel to [place] 10/14-11/18 on mefloquine ppx that was started prior to travel, admitted with fever, weakness, SOB, found to have ST elevations, trop of 10 , s/p normal cath     IMPRESSION  #Elevated Trop/ST elevations with pericardial effusion c/w myopericarditis and transaminitis     Possibly a side effect of mefloquine as can cause pericarditis and other cardiac side effects    R/o typhoid, Q fever, Hep E, rickettsial disease    While initially concerning for severe malaria, parasite smear is negative and mefloquine ppx should have been sufficient    No URI sx to suggest a viral etiology  #Transaminitis with normal Tbili  #HFrEF EF 30-35%  #Lactate Dehydrogenase, Serum: 1424 U/L (11.27.19 @ 00:25)  #Anemia borderline macrocytic, stable   #Lactic acidosis    RECOMMENDATIONS  - call Dr. Simmons to see if malaria testing was performed as outpatient   - send another parasite smear and parasitemia  - f/u blood cultures  - Ceftriaxone 2g q24h IV  - ADD doxy 100mg q12h IV  - Send coxiella Ab, Hep E IgM/IgG, [place] ab, plasmodium PCR, acute hepatitis panel  - Trend LFTs  - HIV ab/ag CMIA, quantiferon gold   - Call [facility] hotline to see if fits with mefloquine-associated pericarditis    Spectra [phone]

## 2019-11-27 NOTE — CONSULT NOTE ADULT - ASSESSMENT
62 y/o M with h/o HTN admitted with c/o SOB, fever, chills found to have myopericarditis. LHC showed clean coronaries. Patient is hemodynamically stable.

## 2019-11-27 NOTE — PROGRESS NOTE ADULT - ASSESSMENT
62 yo male with PMHx of HTN and malaria, presented with SOB and palpitations.     #Weakness, cyclical fevers, Dyspnea -r/o myopericarditis of infectious etiology  -EKG: Sinus rhythm with inferolateral ST elevations and PVCs; Troponin 10.98; CXR unremarkable  -S/P Emergent LHC- Normal coronaries.   -F/U 2D echo, trend CE; F/u lipid profile,HbA1c, TSH  -Rule out malaria as the possible infectious cause. F/U peripheral blood smear for parasites  -f/u hemolysis panel, retic count, LDH, ESR, Blood cx  -ID follow up, They recommended: - send blood cultures  - Ceftriaxone 2g q24h IV, d/c if BCX NG  - repeat LDH (hemolyzed)  - HIV ab/ag CMIA, quantiferon gold   - ESR, CRP   - If + parasite smear---> would start IV artesunate will need to call [facility] hotline  -We called  to send the malaria testing. It was negative on the periphral blood smear. We sent a new one.   -Echo showed reduced EF, we called HF specialist, EP and ID and they are following up   -ECHO f/u tonight  -MRI heart was ordered       #HTN- Monitor for now; DASH diet    #DVT ppx: Heparin sq  #GI ppx: Not indicated  #Diet: NPO for cath; DASH diet post cath  #Activity: IAT  #FULL CODE  #DIspo: CCU

## 2019-11-27 NOTE — CONSULT NOTE ADULT - PROBLEM SELECTOR RECOMMENDATION 9
Likely infectious in origin given prodrome of fever, chills, fatigue after trip to [place]   Follow up ID recommendations   Repeat echo to re-assess LV function and pericardial effusion  No need for diuretics as patient seem euvolemic on exam   Start metoprolol 12.5 mg every 8 hrs   Continue telemetry monitoring   EP evaluation for arrhythmias  Monitor liver enzymes and lactic acid  Colchicine for inflammatory process    Discussed with CCU team and Dr. Prieto

## 2019-11-27 NOTE — PHARMACOTHERAPY INTERVENTION NOTE - COMMENTS
Prescriber was contacted NS 1000ml with Mag 1 grams at 100ml/hr.  Mg=2.1.  As per md pt has torsades and Mag IV was recommended by Electrophysiology Fellow maintaining Mg below 4.

## 2019-11-27 NOTE — PROGRESS NOTE ADULT - SUBJECTIVE AND OBJECTIVE BOX
KRISTAL WHEATLEY  63y, Male  Allergy: No Known Allergies      CHIEF COMPLAINT: SOB, fever, weakness (26 Nov 2019 15:07)      INTERVAL EVENTS/HPI  - T(F): , Max: 101.5 (11-26-19 @ 21:00)  - parasite smear negative  - Denies any worsening symptoms  - Tolerating medication  - WBC Count: 13.44 (11-27-19 @ 04:30) <--WBC Count: 7.62 (11-26-19 @ 09:20)  - Rising LFTS  - Creatinine, Serum: 1.1 (11-27-19 @ 04:30) <--Creatinine, Serum: 1.4 (11-26-19 @ 09:20)       ROS  General: Denies rigors, nightsweats  HEENT: Denies headache, rhinorrhea, sore throat, eye pain  CV: Denies CP, palpitations  PULM: Denies wheezing, hemoptysis  GI: Denies hematemesis, hematochezia, melena  : Denies discharge, hematuria  MSK: Denies arthralgias, myalgias  SKIN: Denies rash, lesions  NEURO: Denies paresthesias, weakness  PSYCH: Denies depression, anxiety    VITALS:  T(F): 99.1, Max: 101.5 (11-26-19 @ 21:00)  HR: 102  BP: 99/75  RR: 32Vital Signs Last 24 Hrs  T(C): 37.3 (27 Nov 2019 08:00), Max: 38.6 (26 Nov 2019 21:00)  T(F): 99.1 (27 Nov 2019 08:00), Max: 101.5 (26 Nov 2019 21:00)  HR: 102 (27 Nov 2019 08:00) (102 - 126)  BP: 99/75 (27 Nov 2019 08:00) (84/71 - 115/71)  BP(mean): 91 (27 Nov 2019 08:00) (65 - 97)  RR: 32 (27 Nov 2019 08:00) (22 - 39)  SpO2: 98% (27 Nov 2019 08:34) (91% - 99%)    PHYSICAL EXAM:  Gen: NAD, on NC  HEENT: Normocephalic, atraumatic  Neck: supple, no lymphadenopathy  CV: Regular rate & regular rhythm  Lungs: decreased BS at bases, no fremitus  Abdomen: Soft, BS present  Ext: Warm, well perfused  Neuro: non focal, awake  Skin: no rash, no erythema  Lines: no phlebitis      FH: Non-contributory  Social Hx: Non-contributory    TESTS & MEASUREMENTS:                        10.2   13.44 )-----------( 315      ( 27 Nov 2019 04:30 )             32.6     11-27    136  |  101  |  20  ----------------------------<  130<H>  5.3<H>   |  20  |  1.1    Ca    8.5      27 Nov 2019 04:30  Mg     2.1     11-27    TPro  7.2  /  Alb  3.8  /  TBili  1.0  /  DBili  x   /  AST  946<H>  /  ALT  920<H>  /  AlkPhos  115  11-27    eGFR if Non African American: 72 mL/min/1.73M2 (11-27-19 @ 04:30)  eGFR if African American: 83 mL/min/1.73M2 (11-27-19 @ 04:30)  eGFR if Non African American: 53 mL/min/1.73M2 (11-26-19 @ 09:20)  eGFR if : 62 mL/min/1.73M2 (11-26-19 @ 09:20)    LIVER FUNCTIONS - ( 27 Nov 2019 04:30 )  Alb: 3.8 g/dL / Pro: 7.2 g/dL / ALK PHOS: 115 U/L / ALT: 920 U/L / AST: 946 U/L / GGT: x                 Lactate, Blood: 3.3 mmol/L (11-27-19 @ 04:30)  Lactate, Blood: 2.4 mmol/L (11-26-19 @ 13:00)      INFECTIOUS DISEASES TESTING      RADIOLOGY & ADDITIONAL TESTS:  I have personally reviewed the last Chest xray  CXR      CT      CARDIOLOGY TESTING  12 Lead ECG:   Ventricular Rate 116 BPM    Atrial Rate 127 BPM    P-R Interval 152 ms    QRS Duration 132 ms    Q-T Interval 364 ms    QTC Calculation(Bezet) 505 ms    P Axis 96 degrees    R Axis -49 degrees    T Axis 76 degrees    Diagnosis Line Undetermined rhythm  Left axis deviation  Non-specific intra-ventricular conduction block  Cannot rule out Anterior infarct , age undetermined  Lateral injury pattern  ** ** ACUTE MI / STEMI ** **  Abnormal ECG    Confirmed by Gordon Malone ([id number]) on 11/27/2019 9:09:10 AM (11-27-19 @ 07:25)  Transthoracic Echocardiogram:    EXAM:  2-D ECHO (TTE) COMPLETE        PROCEDURE DATE:  11/26/2019      INTERPRETATION:  REPORT:    TRANSTHORACIC ECHOCARDIOGRAM REPORT         Patient Name:   KRISTAL WHEATLEY Accession #: [id number]  Medical Rec #:  [id number]    Height:      65.7 in 167.0 cm  YOB: 1955   Weight:      141.1 lb 64.00 kg  Patient Age:    63 years     BSA:         1.72 m²  Patient Gender: M            BP:          109/71 mmHg       Date of Exam:        11/26/2019 12:52:45 PM  Referring Physician: [id number] NICK SAUCEDO  Sonographer:         Nely JAVED  Fellow:              YULIYA Iverson Physician:   Meño Liriano M.D.    Procedure:   2D Echo/Doppler/Color Doppler Complete.  Indications: R07.9 - Chest Pain, unspecified  Diagnosis:   Chest pain, unspecified - R07.9         Summary:   1. Left ventricular ejection fraction, by visual estimation, is 35 to   40%.   2. Elevated mean left atrial pressure.   3. Moderate concentric left ventricular hypertrophy.   4. Global stairn - 7.   5. Moderate pericardial effusion.   6. Best seen in subcostal view and anteriorly.   7. Thickening and calcification of the anterior mitral valve leaflet.   8. Mild-moderate tricuspid regurgitation.   9. PSAP at least 55.  10. Mild aortic regurgitation.  11. Sclerotic aortic valve with normal opening.    PHYSICIAN INTERPRETATION:  Left Ventricle: The left ventricular internal cavity size is mildly   increased. There is moderate concentric left ventricular hypertrophy.   Left ventricular ejection fraction, by visual estimation, is 35 to 40%.   Elevated mean left atrial pressure. Global stairn - 7.  Right Ventricle: The right ventricular size is normal. RV systolic   function is mildly reduced.  Left Atrium: Severely enlarged left atrium.  Pericardium: A moderately sized pericardial effusion is present. There is   no evidence of cardiac tamponade. Best seen in subcostal view and   anteriorly.  Mitral Valve: Thickening and calcification of the anterior mitral valve   leaflet. Mild mitral valve regurgitation is seen.  Tricuspid Valve: The tricuspid valve is not well seen. The tricuspid   valve is normal in structure. Mild-moderate tricuspid regurgitation is   visualized. PSAP at least 55.  Aortic Valve: The aortic valve is trileaflet. No evidence of aortic   stenosis. Sclerotic aortic valve with normal opening. Mild aortic valve   regurgitation is seen.  Pulmonic Valve: The pulmonic valve was not well visualized. Trace   pulmonic valve regurgitation.  Aorta: The aortic root and ascending aorta are structurally normal, with   no evidence of dilitation.  Venous: The inferior vena cava is abnormal. The inferior vena cava was   dilated, with respiratory size variation less than 50%.       2D AND M-MODE MEASUREMENTS (normal ranges within parentheses):  Left                  Normal   Aorta/Left             Normal  Ventricle:                     Atrium:  IVSd        0.85 cm  (0.7-1.1) AoV Cusp       2.11  (1.5-2.6)  (Mmode):                       Separation:     cm  LVPWd       1.18 cm  (0.7-1.1) Left Atrium    4.58  (1.9-4.0)  (Mmode):                       (Mmode):        cm  LVIDd       5.88 cm  (3.4-5.7) Right  (Mmode):                       Ventricle:  LVIDs       4.15 cm            TAPSE:         1.90 cm  (Mmode):  LV FS        29.4 %   (>25%)  (Mmode):  Rel. Wall     0.40    (<0.42)  Thickness  Mm  LV Mass      141.3  Index:        g/m²  Mmode    SPECTRAL DOPPLER ANALYSIS:  Aortic Valve:  AoV VMax:    1.31 m/s AoV Area, Vmax: 2.24 cm² Vmax Indx: 1.30 cm²/m²  AoV Pk Grad: 6.8 mmHg    LVOT Vmax: 1.06 m/s  LVOT VTI:  0.13 m  LVOT Diam: 1.88 cm    Tricuspid Valve and PA/RV Systolic Pressure: TR Max Velocity: 4.14 m/s RA   Pressure:  RVSP/PASP:       [id number] Meño Liriano M.D., Electronically signed on 11/26/2019 at   4:26:42 PM              *** Final ***                    MEÑO LIRIANO MD  This document has been electronically signed. Nov 26 2019 12:52PM             (11-26-19 @ 12:52)      MEDICATIONS  cefTRIAXone   IVPB 2000  chlorhexidine 4% Liquid 1  doxycycline IVPB 100  doxycycline IVPB   furosemide   Injectable 40  heparin  Injectable 5000  melatonin 5      ANTIBIOTICS:  cefTRIAXone   IVPB 2000 milliGRAM(s) IV Intermittent every 24 hours  doxycycline IVPB 100 milliGRAM(s) IV Intermittent every 12 hours  doxycycline IVPB          All available historical records have been reviewed

## 2019-11-27 NOTE — CONSULT NOTE ADULT - SUBJECTIVE AND OBJECTIVE BOX
HPI:  64 yo male  PMHx: Htn, Malaria (Recently diagnosed on ATovaquin/Proguanil)  CC: SOB, fever, weakness  History dates back to 11/18 Monday when patient started feeling weak with intermittent fevers, chills and shivering. Pt previously was in [place] in the month of october and had been on Chemoprophylaxis with Mefloquine. Pt had been feeling fine during his trip.   Once patient came back, he was feeling weak. As per the wife, patient felt feverish, (fever not documented) which would get better with Tylenol and then pt would have similar symptoms after a few hours.   Pt went to see the family doctor on Friday had blood test done and the CXR done on Saturday and he was put on Atovaquine-Proguanil 4 times a day for 3 days, which he completed the course.   Today, pt was having worsening symptoms with dizziness/lightheadedness and nausea this AM but no vomiting.   Pt denied chest pain, dyspnea, palpitations, cough, urinary symptoms, headaches, hematuria, hematochezia, trauma, LOC.  ER Course: Vitals on admission were /55, HR 39, T 97F, SpO2 100% on RA. EKG showed sinus rhythm with inferolateral ST elevation and PVCs. Bedside echo showed lateral wall hypokineses and pericardial effusion. Troponin was 10.98. Code STEMI called. Pt underwent emergent LHC. Normal coronaries seen. (26 Nov 2019 09:52)        PAST MEDICAL & SURGICAL HISTORY  Malaria  Hypertension  No significant past surgical history      FAMILY HISTORY:  FAMILY HISTORY:  No pertinent family history in first degree relatives      SOCIAL HISTORY:  []smoker  []Alcohol  []Drug    ALLERGIES:  No Known Allergies      MEDICATIONS:  MEDICATIONS  (STANDING):  cefTRIAXone   IVPB 2000 milliGRAM(s) IV Intermittent every 24 hours  chlorhexidine 4% Liquid 1 Application(s) Topical <User Schedule>  doxycycline IVPB 100 milliGRAM(s) IV Intermittent every 12 hours  doxycycline IVPB      furosemide   Injectable 40 milliGRAM(s) IV Push once  heparin  Injectable 5000 Unit(s) SubCutaneous every 8 hours  melatonin 5 milliGRAM(s) Oral at bedtime    MEDICATIONS  (PRN):  acetaminophen   Tablet .. 650 milliGRAM(s) Oral every 6 hours PRN Temp greater or equal to 38C (100.4F)      HOME MEDICATIONS:  Home Medications:  arginine 500 mg oral capsule: 1 cap(s) orally 2 times a day (27 Nov 2019 08:08)  atovaquone-proguanil: orally 4 times a day (27 Nov 2019 08:08)      VITALS:   T(F): 99.1 (11-27 @ 08:00), Max: 101.5 (11-26 @ 21:00)  HR: 98 (11-27 @ 10:00) (39 - 126)  BP: 95/67 (11-27 @ 10:00) (84/71 - 115/71)  BP(mean): 76 (11-27 @ 10:00) (65 - 97)  RR: 34 (11-27 @ 10:00) (18 - 39)  SpO2: 99% (11-27 @ 10:00) (91% - 100%)    I&O's Summary    26 Nov 2019 07:01  -  27 Nov 2019 07:00  --------------------------------------------------------  IN: 50 mL / OUT: 2 mL / NET: 48 mL        REVIEW OF SYSTEMS:  see HPI    PHYSICAL EXAM:  NEURO: patient is awake , alert and oriented  GEN: pt is SOB , sat 100% on NC  NECK: no thyroid enlargement,  LUNGS: bilateral crackles  CARDIOVASCULAR: S1/S2 present, irregular, no murmurs  ABD: Soft, non-tender, non-distended, +BS  EXT: No ROSA MARIA      LABS:                        10.2   13.44 )-----------( 315      ( 27 Nov 2019 04:30 )             32.6     11-27    136  |  101  |  20  ----------------------------<  130<H>  5.3<H>   |  20  |  1.1    Ca    8.5      27 Nov 2019 04:30  Mg     2.1     11-27    TPro  7.2  /  Alb  3.8  /  TBili  1.0  /  DBili  x   /  AST  946<H>  /  ALT  920<H>  /  AlkPhos  115  11-27    PT/INR - ( 26 Nov 2019 09:20 )   PT: 13.40 sec;   INR: 1.17 ratio         PTT - ( 26 Nov 2019 09:20 )  PTT:34.5 sec  Troponin T, Serum: 7.47 ng/mL <HH> (11-27-19 @ 04:30)  Lactate, Blood: 3.3 mmol/L <H> (11-27-19 @ 04:30)  Sedimentation Rate, Erythrocyte: 68 mm/Hr <H> (11-27-19 @ 04:30)  Sedimentation Rate, Erythrocyte: 70 mm/Hr <H> (11-27-19 @ 00:25)  Lactate, Blood: 2.4 mmol/L <H> (11-26-19 @ 13:00)  Creatine Kinase, Serum: 1602 U/L <H> (11-26-19 @ 13:00)    CARDIAC MARKERS ( 27 Nov 2019 04:30 )  x     / 7.47 ng/mL / x     / x     / x      CARDIAC MARKERS ( 26 Nov 2019 13:00 )  x     / x     / 1602 U/L / x     / x      CARDIAC MARKERS ( 26 Nov 2019 09:20 )  x     / 10.98 ng/mL / x     / x     / x        Serum Pro-Brain Natriuretic Peptide: 3802 pg/mL (11-26-19 @ 09:20)    11-27 Chol 170  HDL 39<L> Trig 92      RADIOLOGY:  -CXR: < from: Xray Chest 1 View- PORTABLE-Routine (11.27.19 @ 06:35) >  Impression:      Unchanged bilateral reticular opacities    < end of copied text >    -TTE: < from: Transthoracic Echocardiogram (11.26.19 @ 12:52) >   1. Left ventricular ejection fraction, by visual estimation, is 35 to   40%.   2. Elevated mean left atrial pressure.   3. Moderate concentric left ventricular hypertrophy.   4. Global stairn - 7.   5. Moderate pericardial effusion.   6. Best seen in subcostal view and anteriorly.   7. Thickening and calcification of the anterior mitral valve leaflet.   8. Mild-moderate tricuspid regurgitation.   9. PSAP at least 55.  10. Mild aortic regurgitation.  11. Sclerotic aortic valve with normal opening.    < end of copied text >        ECG:  sinus with diffuse ST elevation and PVCs in bigeminy pattern

## 2019-11-27 NOTE — CONSULT NOTE ADULT - ASSESSMENT
63 yrs old recently travelled to [facility], was on mefloquine for malaria prophylaxis , presented for fever chills myalgia , found to have ST elevation on ECG  , was taken to cath LAb , which showed normal coronaries. pt was also found to have moderate pericardial effusion , elevated trop , picture consistent with myopericarditis.  EP called  for evaluation of abnormal rhythm. 63 yrs old recently travelled to [place], was on mefloquine for malaria prophylaxis , presented for fever chills myalgia , found to have ST elevation on ECG  , was taken to cath LAb , which showed normal coronaries. pt was also found to have moderate pericardial effusion , elevated trop , picture consistent with myopericarditis.  EP called  for evaluation of abnormal rhythm.  ECG reviewed upon admission , pt had sinus rhythm with intraventricular conduction disease and PVC in Bigeminy pattern, and diffuse ST elevation  repeat ECG showed PVCs with different morphologies  likely pt had severe intraventricular conduction disease with PVC related to myocarditis    -recommendation:  ID consult  once euvolemic and if SBP >100 , start metoprolol low dose  repeat 2d echo to r/o worsening effusion  if not worsening effusion , give lasix ( pt has crackles on exam)  may need Cardiac MRI for further evaluation of Myocardial damage  avoid exercice for 6months from now  consider start NSAID/colchicine  continue with telemetry  will discuss with attending 63 yrs old recently travelled to [place], was on mefloquine for malaria prophylaxis , presented for fever chills myalgia , found to have ST elevation on ECG  , was taken to cath LAb , which showed normal coronaries. pt was also found to have moderate pericardial effusion , elevated trop , picture consistent with myopericarditis.  EP called  for evaluation of abnormal rhythm.  ECG reviewed upon admission , pt had sinus rhythm with intraventricular conduction disease and PVC in Bigeminy pattern, and diffuse ST elevation  repeat ECG showed PVCs with different morphologies  likely pt had severe intraventricular conduction disease with PVC related to myocarditis    -recommendation:  ID consult  once euvolemic and if SBP >100 , start metoprolol low dose  repeat 2d echo to r/o worsening effusion  if not worsening effusion , give lasix ( pt has crackles on exam)  recommend Cardiac MRI for further evaluation of Myocardial damage  Alternating QRS axis on Tele, start Magnesium drip @1mg/hr to prevent Torsades  hold if Magnesium >4.0  avoid exercise for 6months from now  consider start NSAID/colchicine  continue with telemetry  will discuss with attending

## 2019-11-27 NOTE — CONSULT NOTE ADULT - PROBLEM SELECTOR RECOMMENDATION 2
In the context of myopericarditis   LVEF ~35-40% with elevated filling pressures   Improved after IV diuretics   Hold diuretics for now   Start BB as above   Cardiac MRI for further assessment of cardiomyopathy

## 2019-11-27 NOTE — PROGRESS NOTE ADULT - SUBJECTIVE AND OBJECTIVE BOX
SUBJ:No chest pain or shortness of breath      MEDICATIONS  (STANDING):  cefTRIAXone   IVPB 2000 milliGRAM(s) IV Intermittent every 24 hours  chlorhexidine 4% Liquid 1 Application(s) Topical <User Schedule>  doxycycline IVPB 100 milliGRAM(s) IV Intermittent every 12 hours  doxycycline IVPB      furosemide   Injectable 40 milliGRAM(s) IV Push once  heparin  Injectable 5000 Unit(s) SubCutaneous every 8 hours  melatonin 5 milliGRAM(s) Oral at bedtime  ondansetron Injectable 4 milliGRAM(s) IV Push once  sodium chloride 0.9% 1000 milliLiter(s) (100 mL/Hr) IV Continuous <Continuous>  vancomycin  IVPB        MEDICATIONS  (PRN):  acetaminophen   Tablet .. 650 milliGRAM(s) Oral every 6 hours PRN Temp greater or equal to 38C (100.4F)            Vital Signs Last 24 Hrs  T(C): 38.6 (27 Nov 2019 17:00), Max: 38.6 (27 Nov 2019 07:00)  T(F): 101.5 (27 Nov 2019 17:00), Max: 101.5 (27 Nov 2019 17:00)  HR: 86 (27 Nov 2019 21:00) (86 - 126)  BP: 97/59 (27 Nov 2019 21:00) (75/61 - 125/67)  BP(mean): 73 (27 Nov 2019 21:00) (65 - 93)  RR: 27 (27 Nov 2019 21:00) (20 - 47)  SpO2: 99% (27 Nov 2019 21:00) (92% - 100%)      ECG:NML    TTE:    LABS:                        10.2   13.44 )-----------( 315      ( 27 Nov 2019 04:30 )             32.6     11-27    136  |  101  |  20  ----------------------------<  130<H>  5.3<H>   |  20  |  1.1    Ca    8.5      27 Nov 2019 04:30  Mg     2.1     11-27    TPro  7.2  /  Alb  3.8  /  TBili  1.0  /  DBili  x   /  AST  946<H>  /  ALT  920<H>  /  AlkPhos  115  11-27    CARDIAC MARKERS ( 27 Nov 2019 04:30 )  x     / 7.47 ng/mL / x     / x     / x      CARDIAC MARKERS ( 26 Nov 2019 13:00 )  x     / x     / 1602 U/L / x     / x      CARDIAC MARKERS ( 26 Nov 2019 09:20 )  x     / 10.98 ng/mL / x     / x     / x          PT/INR - ( 26 Nov 2019 09:20 )   PT: 13.40 sec;   INR: 1.17 ratio         PTT - ( 26 Nov 2019 09:20 )  PTT:34.5 sec    I&O's Summary    26 Nov 2019 07:01  -  27 Nov 2019 07:00  --------------------------------------------------------  IN: 50 mL / OUT: 2 mL / NET: 48 mL    27 Nov 2019 07:01  -  27 Nov 2019 22:11  --------------------------------------------------------  IN: 700 mL / OUT: 150 mL / NET: 550 mL      BNP

## 2019-11-27 NOTE — PROGRESS NOTE ADULT - SUBJECTIVE AND OBJECTIVE BOX
24H events:    Patient is a 63y old Male who presents with a chief complaint of SOB, fever, weakness (27 Nov 2019 13:06)    Primary diagnosis of STEMI (ST elevation myocardial infarction)     Today is hospital day 1d.     PAST MEDICAL & SURGICAL HISTORY  Malaria  Hypertension  No significant past surgical history    SOCIAL HISTORY:  Negative for smoking/alcohol/drug use.     ALLERGIES:  No Known Allergies    MEDICATIONS:  STANDING MEDICATIONS  cefTRIAXone   IVPB 2000 milliGRAM(s) IV Intermittent every 24 hours  chlorhexidine 4% Liquid 1 Application(s) Topical <User Schedule>  doxycycline IVPB 100 milliGRAM(s) IV Intermittent every 12 hours  doxycycline IVPB      furosemide   Injectable 40 milliGRAM(s) IV Push once  heparin  Injectable 5000 Unit(s) SubCutaneous every 8 hours  melatonin 5 milliGRAM(s) Oral at bedtime    PRN MEDICATIONS  acetaminophen   Tablet .. 650 milliGRAM(s) Oral every 6 hours PRN    VITALS:   T(F): 98.1  HR: 118  BP: 119/59  RR: 34  SpO2: 99%    LABS:                        10.2   13.44 )-----------( 315      ( 27 Nov 2019 04:30 )             32.6     11-27    136  |  101  |  20  ----------------------------<  130<H>  5.3<H>   |  20  |  1.1    Ca    8.5      27 Nov 2019 04:30  Mg     2.1     11-27    TPro  7.2  /  Alb  3.8  /  TBili  1.0  /  DBili  x   /  AST  946<H>  /  ALT  920<H>  /  AlkPhos  115  11-27    PT/INR - ( 26 Nov 2019 09:20 )   PT: 13.40 sec;   INR: 1.17 ratio         PTT - ( 26 Nov 2019 09:20 )  PTT:34.5 sec      Troponin T, Serum: 7.47 ng/mL <HH> (11-27-19 @ 04:30)  Lactate, Blood: 3.3 mmol/L <H> (11-27-19 @ 04:30)  Sedimentation Rate, Erythrocyte: 68 mm/Hr <H> (11-27-19 @ 04:30)  Sedimentation Rate, Erythrocyte: 70 mm/Hr <H> (11-27-19 @ 00:25)      CARDIAC MARKERS ( 27 Nov 2019 04:30 )  x     / 7.47 ng/mL / x     / x     / x      CARDIAC MARKERS ( 26 Nov 2019 13:00 )  x     / x     / 1602 U/L / x     / x      CARDIAC MARKERS ( 26 Nov 2019 09:20 )  x     / 10.98 ng/mL / x     / x     / x          RADIOLOGY:  EXAM:  XR CHEST PORTABLE ROUTINE 1V            PROCEDURE DATE:  11/27/2019            INTERPRETATION:  Clinical History / Reason for exam: Shortness of breath    Comparison : Chest radiograph 11/26/2019    Technique/Positioning: Frontal view of thechest    Findings:    Support devices: Telemetry leads.    Cardiac/mediastinum/hilum: Stable.    Lung parenchyma/Pleura: There are unchanged bilateral reticular opacities    Skeleton/soft tissues: Stable    Impression:      Unchanged bilateral reticular opacities     EXAM:  2-D ECHO (TTE) COMPLETE        PROCEDURE DATE:  11/26/2019      INTERPRETATION:  REPORT:    TRANSTHORACIC ECHOCARDIOGRAM REPORT         Patient Name:   KRISTAL WHEATLEY Accession #: [id number]  Medical Rec #:  [id number]    Height:      65.7 in 167.0 cm  YOB: 1955   Weight:      141.1 lb 64.00 kg  Patient Age:    63 years     BSA:         1.72 m²  Patient Gender: M            BP:          109/71 mmHg       Date of Exam:        11/26/2019 12:52:45 PM  Referring Physician: [id number] NICK SAUCEDO  Sonographer:         Nely JAVED  Fellow:              YULIYA Iverson Physician:   Meño Liriano M.D.    Procedure:   2D Echo/Doppler/Color Doppler Complete.  Indications: R07.9 - Chest Pain, unspecified  Diagnosis:   Chest pain, unspecified - R07.9         Summary:   1. Left ventricular ejection fraction, by visual estimation, is 35 to   40%.   2. Elevated mean left atrial pressure.   3. Moderate concentric left ventricular hypertrophy.   4. Global stairn - 7.   5. Moderate pericardial effusion.   6. Best seen in subcostal view and anteriorly.   7. Thickening and calcification of the anterior mitral valve leaflet.   8. Mild-moderate tricuspid regurgitation.   9. PSAP at least 55.  10. Mild aortic regurgitation.  11. Sclerotic aortic valve with normal opening.    PHYSICIAN INTERPRETATION:  Left Ventricle: The left ventricular internal cavity size is mildly   increased. There is moderate concentric left ventricular hypertrophy.   Left ventricular ejection fraction, by visual estimation, is 35 to 40%.   Elevated mean left atrial pressure. Global stairn - 7.  Right Ventricle: The right ventricular size is normal. RV systolic   function is mildly reduced.  Left Atrium: Severely enlarged left atrium.  Pericardium: A moderately sized pericardial effusion is present. There is   no evidence of cardiac tamponade. Best seen in subcostal view and   anteriorly.  Mitral Valve: Thickening and calcification of the anterior mitral valve   leaflet. Mild mitral valve regurgitation is seen.  Tricuspid Valve: The tricuspid valve is not well seen. The tricuspid   valve is normal in structure. Mild-moderate tricuspid regurgitation is   visualized. PSAP at least 55.  Aortic Valve: The aortic valve is trileaflet. No evidence of aortic   stenosis. Sclerotic aortic valve with normal opening. Mild aortic valve   regurgitation is seen.  Pulmonic Valve: The pulmonic valve was not well visualized. Trace   pulmonic valve regurgitation.  Aorta: The aortic root and ascending aorta are structurally normal, with   no evidence of dilitation.  Venous: The inferior vena cava is abnormal. The inferior vena cava was   dilated, with respiratory size variation less than 50%.       2D AND M-MODE MEASUREMENTS (normal ranges within parentheses):  Left                  Normal   Aorta/Left             Normal  Ventricle:                     Atrium:  IVSd        0.85 cm  (0.7-1.1) AoV Cusp       2.11  (1.5-2.6)  (Mmode):                       Separation:     cm  LVPWd       1.18 cm  (0.7-1.1) Left Atrium    4.58  (1.9-4.0)  (Mmode):                       (Mmode):        cm  LVIDd       5.88 cm  (3.4-5.7) Right  (Mmode):                       Ventricle:  LVIDs       4.15 cm            TAPSE:         1.90 cm  (Mmode):  LV FS        29.4 %   (>25%)  (Mmode):  Rel. Wall     0.40    (<0.42)  Thickness  Mm  LV Mass      141.3  Index:        g/m²  Mmode    SPECTRAL DOPPLER ANALYSIS:  Aortic Valve:  AoV VMax:    1.31 m/s AoV Area, Vmax: 2.24 cm² Vmax Indx: 1.30 cm²/m²  AoV Pk Grad: 6.8 mmHg    LVOT Vmax: 1.06 m/s  LVOT VTI:  0.13 m  LVOT Diam: 1.88 cm    Tricuspid Valve and PA/RV Systolic Pressure: TR Max Velocity: 4.14 m/s RA   Pressure:  RVSP/PASP:       [id number] Meño Liriano M.D., Electronically signed on 11/26/2019 at   4:26:42 PM              *** Final ***                    MEÑO LIRIANO MD  This document has been electronically signed. Nov 26 2019 12:52PM                    SUDARSHAN ZELAYA M.D., ATTENDING RADIOLOGIST  This document has been electronically signed. Nov 27 2019  9:14AM    PHYSICAL EXAM:

## 2019-11-27 NOTE — CONSULT NOTE ADULT - SUBJECTIVE AND OBJECTIVE BOX
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 He recently returned from a trip to [facility] and visited his PMD after developing symptoms of fever, fatigue, chills. He was prescribed treatment for malaria, however, his symptoms persistent and he decided to come to the hospital.      Upon arrival to ED, a STEMI code was activated due to ST segment elevations on his ECG. He underwent LHC with normal coronaries. A TTE showed LV systolic dysfunction with LVEF ~ 35%, elevated filling pressures and pericardial effusion but no clear e/o tamponade.     His cardiac enzymes peaked at 10 with lactic acid 3.3 and elevated LFTs.         On further questioning, patient reports being an active person, he is actually a long distance runner and didn't have any difficulty with physical exertion prior to this episode. He says several years ago (6-8) he had episode of syncope and was admitted to [facility] in [place] but the work up was negative. He was diagnosed with HTN.       PMH: HTN     Social: Denies ETOH, smoking or drugs       FH: Mother is alive in her 80s, no h/o heart disease.

## 2019-11-28 LAB
ALBUMIN SERPL ELPH-MCNC: 3.6 G/DL — SIGNIFICANT CHANGE UP (ref 3.5–5.2)
ALP SERPL-CCNC: 118 U/L — HIGH (ref 30–115)
ALT FLD-CCNC: 1621 U/L — HIGH (ref 0–41)
ANION GAP SERPL CALC-SCNC: 20 MMOL/L — HIGH (ref 7–14)
APPEARANCE UR: ABNORMAL
AST SERPL-CCNC: 1576 U/L — HIGH (ref 0–41)
BACTERIA # UR AUTO: NEGATIVE — SIGNIFICANT CHANGE UP
BASOPHILS # BLD AUTO: 0.04 K/UL — SIGNIFICANT CHANGE UP (ref 0–0.2)
BASOPHILS NFR BLD AUTO: 0.3 % — SIGNIFICANT CHANGE UP (ref 0–1)
BILIRUB SERPL-MCNC: 1.7 MG/DL — HIGH (ref 0.2–1.2)
BILIRUB UR-MCNC: NEGATIVE — SIGNIFICANT CHANGE UP
BUN SERPL-MCNC: 18 MG/DL — SIGNIFICANT CHANGE UP (ref 10–20)
CALCIUM SERPL-MCNC: 7.9 MG/DL — LOW (ref 8.5–10.1)
CHLORIDE SERPL-SCNC: 95 MMOL/L — LOW (ref 98–110)
CK SERPL-CCNC: 955 U/L — HIGH (ref 0–225)
CO2 SERPL-SCNC: 15 MMOL/L — LOW (ref 17–32)
COLOR SPEC: ABNORMAL
CREAT SERPL-MCNC: 1 MG/DL — SIGNIFICANT CHANGE UP (ref 0.7–1.5)
DIFF PNL FLD: NEGATIVE — SIGNIFICANT CHANGE UP
EOSINOPHIL # BLD AUTO: 0.01 K/UL — SIGNIFICANT CHANGE UP (ref 0–0.7)
EOSINOPHIL NFR BLD AUTO: 0.1 % — SIGNIFICANT CHANGE UP (ref 0–8)
EPI CELLS # UR: 2 /HPF — SIGNIFICANT CHANGE UP (ref 0–5)
ERYTHROCYTE [SEDIMENTATION RATE] IN BLOOD: 79 MM/HR — HIGH (ref 0–10)
FERRITIN SERPL-MCNC: 3597 NG/ML — HIGH (ref 30–400)
GLUCOSE SERPL-MCNC: 116 MG/DL — HIGH (ref 70–99)
GLUCOSE UR QL: NEGATIVE — SIGNIFICANT CHANGE UP
HAV IGM SER-ACNC: SIGNIFICANT CHANGE UP
HBV CORE IGM SER-ACNC: SIGNIFICANT CHANGE UP
HBV SURFACE AG SER-ACNC: SIGNIFICANT CHANGE UP
HCT VFR BLD CALC: 29.3 % — LOW (ref 42–52)
HCV AB S/CO SERPL IA: 0.09 S/CO — SIGNIFICANT CHANGE UP (ref 0–0.99)
HCV AB SERPL-IMP: SIGNIFICANT CHANGE UP
HGB BLD-MCNC: 8.9 G/DL — LOW (ref 14–18)
HYALINE CASTS # UR AUTO: 10 /LPF — HIGH (ref 0–7)
IMM GRANULOCYTES NFR BLD AUTO: 3 % — HIGH (ref 0.1–0.3)
KETONES UR-MCNC: NEGATIVE — SIGNIFICANT CHANGE UP
LACTATE SERPL-SCNC: 3.8 MMOL/L — HIGH (ref 0.7–2)
LACTATE SERPL-SCNC: 4.7 MMOL/L — CRITICAL HIGH (ref 0.7–2)
LDH SERPL L TO P-CCNC: 1975 U/L — HIGH (ref 50–242)
LEUKOCYTE ESTERASE UR-ACNC: NEGATIVE — SIGNIFICANT CHANGE UP
LYMPHOCYTES # BLD AUTO: 13.6 % — LOW (ref 20.5–51.1)
LYMPHOCYTES # BLD AUTO: 2.01 K/UL — SIGNIFICANT CHANGE UP (ref 1.2–3.4)
MAGNESIUM SERPL-MCNC: 3.8 MG/DL — CRITICAL HIGH (ref 1.8–2.4)
MCHC RBC-ENTMCNC: 29.7 PG — SIGNIFICANT CHANGE UP (ref 27–31)
MCHC RBC-ENTMCNC: 30.4 G/DL — LOW (ref 32–37)
MCV RBC AUTO: 97.7 FL — HIGH (ref 80–94)
MONOCYTES # BLD AUTO: 0.92 K/UL — HIGH (ref 0.1–0.6)
MONOCYTES NFR BLD AUTO: 6.2 % — SIGNIFICANT CHANGE UP (ref 1.7–9.3)
NEUTROPHILS # BLD AUTO: 11.33 K/UL — HIGH (ref 1.4–6.5)
NEUTROPHILS NFR BLD AUTO: 76.8 % — HIGH (ref 42.2–75.2)
NITRITE UR-MCNC: NEGATIVE — SIGNIFICANT CHANGE UP
NRBC # BLD: 0 /100 WBCS — SIGNIFICANT CHANGE UP (ref 0–0)
PH UR: 6 — SIGNIFICANT CHANGE UP (ref 5–8)
PLATELET # BLD AUTO: 259 K/UL — SIGNIFICANT CHANGE UP (ref 130–400)
POTASSIUM SERPL-MCNC: 4.9 MMOL/L — SIGNIFICANT CHANGE UP (ref 3.5–5)
POTASSIUM SERPL-SCNC: 4.9 MMOL/L — SIGNIFICANT CHANGE UP (ref 3.5–5)
PROT SERPL-MCNC: 6.9 G/DL — SIGNIFICANT CHANGE UP (ref 6–8)
PROT UR-MCNC: ABNORMAL
RBC # BLD: 3 M/UL — LOW (ref 4.7–6.1)
RBC # FLD: 12.6 % — SIGNIFICANT CHANGE UP (ref 11.5–14.5)
RBC CASTS # UR COMP ASSIST: 8 /HPF — HIGH (ref 0–4)
SODIUM SERPL-SCNC: 130 MMOL/L — LOW (ref 135–146)
SP GR SPEC: 1.03 — HIGH (ref 1.01–1.02)
TROPONIN T SERPL-MCNC: 5.95 NG/ML — CRITICAL HIGH
UROBILINOGEN FLD QL: ABNORMAL
WBC # BLD: 14.76 K/UL — HIGH (ref 4.8–10.8)
WBC # FLD AUTO: 14.76 K/UL — HIGH (ref 4.8–10.8)
WBC UR QL: 1 /HPF — SIGNIFICANT CHANGE UP (ref 0–5)

## 2019-11-28 PROCEDURE — 71045 X-RAY EXAM CHEST 1 VIEW: CPT | Mod: 26

## 2019-11-28 PROCEDURE — 99232 SBSQ HOSP IP/OBS MODERATE 35: CPT

## 2019-11-28 PROCEDURE — 99233 SBSQ HOSP IP/OBS HIGH 50: CPT

## 2019-11-28 PROCEDURE — 93010 ELECTROCARDIOGRAM REPORT: CPT

## 2019-11-28 RX ORDER — ASPIRIN/CALCIUM CARB/MAGNESIUM 324 MG
325 TABLET ORAL DAILY
Refills: 0 | Status: DISCONTINUED | OUTPATIENT
Start: 2019-11-28 | End: 2019-11-29

## 2019-11-28 RX ORDER — IPRATROPIUM/ALBUTEROL SULFATE 18-103MCG
3 AEROSOL WITH ADAPTER (GRAM) INHALATION ONCE
Refills: 0 | Status: COMPLETED | OUTPATIENT
Start: 2019-11-28 | End: 2019-11-28

## 2019-11-28 RX ORDER — HYDROCORTISONE 20 MG
100 TABLET ORAL EVERY 8 HOURS
Refills: 0 | Status: DISCONTINUED | OUTPATIENT
Start: 2019-11-28 | End: 2019-11-29

## 2019-11-28 RX ADMIN — Medication 250 MILLIGRAM(S): at 05:19

## 2019-11-28 RX ADMIN — Medication 110 MILLIGRAM(S): at 18:21

## 2019-11-28 RX ADMIN — Medication 325 MILLIGRAM(S): at 21:22

## 2019-11-28 RX ADMIN — Medication 250 MILLIGRAM(S): at 18:21

## 2019-11-28 RX ADMIN — SODIUM CHLORIDE 100 MILLILITER(S): 9 INJECTION, SOLUTION INTRAVENOUS at 07:34

## 2019-11-28 RX ADMIN — HEPARIN SODIUM 5000 UNIT(S): 5000 INJECTION INTRAVENOUS; SUBCUTANEOUS at 21:21

## 2019-11-28 RX ADMIN — Medication 650 MILLIGRAM(S): at 19:21

## 2019-11-28 RX ADMIN — HEPARIN SODIUM 5000 UNIT(S): 5000 INJECTION INTRAVENOUS; SUBCUTANEOUS at 05:19

## 2019-11-28 RX ADMIN — ONDANSETRON 4 MILLIGRAM(S): 8 TABLET, FILM COATED ORAL at 01:38

## 2019-11-28 RX ADMIN — Medication 3 MILLILITER(S): at 15:37

## 2019-11-28 RX ADMIN — CEFTRIAXONE 100 MILLIGRAM(S): 500 INJECTION, POWDER, FOR SOLUTION INTRAMUSCULAR; INTRAVENOUS at 21:23

## 2019-11-28 RX ADMIN — Medication 200 MILLIGRAM(S): at 21:56

## 2019-11-28 RX ADMIN — HEPARIN SODIUM 5000 UNIT(S): 5000 INJECTION INTRAVENOUS; SUBCUTANEOUS at 14:16

## 2019-11-28 RX ADMIN — Medication 110 MILLIGRAM(S): at 05:19

## 2019-11-28 RX ADMIN — Medication 100 MILLIGRAM(S): at 21:21

## 2019-11-28 RX ADMIN — Medication 5 MILLIGRAM(S): at 21:21

## 2019-11-28 NOTE — CHART NOTE - NSCHARTNOTEFT_GEN_A_CORE
Discussed the case with Hospitalist. Given worsening LFT, LDH and decrease LV function, myocarditis is possible  - etiology might include viral vs gient cell myocarditis. Bacterial infection is less likely given negative Cx. ID note appreciated  - will start steroids  - CHF consult Dr. Shankar   - If patient becomes hemodynamic unstable patient will require ECMO support; At this time patient does not require any hemodynamic support.  - I have contacted CT surgery to izabela involved if ECHMO is required.

## 2019-11-28 NOTE — PROGRESS NOTE ADULT - SUBJECTIVE AND OBJECTIVE BOX
SUBJ:No chest pain or shortness of breath      MEDICATIONS  (STANDING):  cefTRIAXone   IVPB 2000 milliGRAM(s) IV Intermittent every 24 hours  chlorhexidine 4% Liquid 1 Application(s) Topical <User Schedule>  doxycycline IVPB 100 milliGRAM(s) IV Intermittent every 12 hours  doxycycline IVPB      furosemide   Injectable 40 milliGRAM(s) IV Push once  heparin  Injectable 5000 Unit(s) SubCutaneous every 8 hours  melatonin 5 milliGRAM(s) Oral at bedtime  sodium chloride 0.9% 1000 milliLiter(s) (100 mL/Hr) IV Continuous <Continuous>  vancomycin  IVPB 1000 milliGRAM(s) IV Intermittent every 12 hours  vancomycin  IVPB        MEDICATIONS  (PRN):  acetaminophen   Tablet .. 650 milliGRAM(s) Oral every 6 hours PRN Temp greater or equal to 38C (100.4F)            Vital Signs Last 24 Hrs  T(C): 36.2 (28 Nov 2019 08:00), Max: 38.6 (27 Nov 2019 17:00)  T(F): 97.2 (28 Nov 2019 08:00), Max: 101.5 (27 Nov 2019 17:00)  HR: 86 (28 Nov 2019 10:00) (86 - 124)  BP: 101/64 (28 Nov 2019 10:00) (75/61 - 125/67)  BP(mean): 74 (28 Nov 2019 10:00) (66 - 83)  RR: 33 (28 Nov 2019 10:00) (20 - 47)  SpO2: 97% (28 Nov 2019 10:00) (94% - 99%)      ECG:NML    TTE:    LABS:                        8.9    14.76 )-----------( 259      ( 28 Nov 2019 04:32 )             29.3     11-28    130<L>  |  95<L>  |  18  ----------------------------<  116<H>  4.9   |  15<L>  |  1.0    Ca    7.9<L>      28 Nov 2019 04:32  Mg     3.8     11-28    TPro  6.9  /  Alb  3.6  /  TBili  1.7<H>  /  DBili  x   /  AST  1576<H>  /  ALT  1621<H>  /  AlkPhos  118<H>  11-28    CARDIAC MARKERS ( 28 Nov 2019 04:32 )  x     / 5.95 ng/mL / x     / x     / x      CARDIAC MARKERS ( 27 Nov 2019 04:30 )  x     / 7.47 ng/mL / x     / x     / x      CARDIAC MARKERS ( 26 Nov 2019 13:00 )  x     / x     / 1602 U/L / x     / x              I&O's Summary    27 Nov 2019 07:01  -  28 Nov 2019 07:00  --------------------------------------------------------  IN: 2100 mL / OUT: 150 mL / NET: 1950 mL    28 Nov 2019 07:01  -  28 Nov 2019 11:30  --------------------------------------------------------  IN: 660 mL / OUT: 275 mL / NET: 385 mL      BNP

## 2019-11-28 NOTE — PROGRESS NOTE ADULT - SUBJECTIVE AND OBJECTIVE BOX
24H events:    Patient is a 63y old Male who presents with a chief complaint of SOB, fever, weakness (2019 14:49)    Primary diagnosis of STEMI (ST elevation myocardial infarction)     Today is hospital day 2d.     PAST MEDICAL & SURGICAL HISTORY  Malaria  Hypertension  No significant past surgical history    SOCIAL HISTORY:  Negative for smoking/alcohol/drug use.     ALLERGIES:  No Known Allergies    MEDICATIONS:  STANDING MEDICATIONS  albuterol/ipratropium for Nebulization. 3 milliLiter(s) Nebulizer once  aspirin 325 milliGRAM(s) Oral daily  cefTRIAXone   IVPB 2000 milliGRAM(s) IV Intermittent every 24 hours  chlorhexidine 4% Liquid 1 Application(s) Topical <User Schedule>  doxycycline IVPB 100 milliGRAM(s) IV Intermittent every 12 hours  doxycycline IVPB      furosemide   Injectable 40 milliGRAM(s) IV Push once  heparin  Injectable 5000 Unit(s) SubCutaneous every 8 hours  hydrocortisone sodium succinate Injectable 100 milliGRAM(s) IV Push every 8 hours  melatonin 5 milliGRAM(s) Oral at bedtime  vancomycin  IVPB 1000 milliGRAM(s) IV Intermittent every 12 hours  vancomycin  IVPB        PRN MEDICATIONS  acetaminophen   Tablet .. 650 milliGRAM(s) Oral every 6 hours PRN    VITALS:   T(F): 99.8  HR: 87  BP: 116/81  RR: 34  SpO2: 91%    LABS:                        8.9    14.76 )-----------( 259      ( 2019 04:32 )             29.3         130<L>  |  95<L>  |  18  ----------------------------<  116<H>  4.9   |  15<L>  |  1.0    Ca    7.9<L>      2019 04:32  Mg     3.8         TPro  6.9  /  Alb  3.6  /  TBili  1.7<H>  /  DBili  x   /  AST  1576<H>  /  ALT  1621<H>  /  AlkPhos  118<H>        Urinalysis Basic - ( 2019 10:20 )    Color: Erin / Appearance: Slightly Turbid / S.033 / pH: x  Gluc: x / Ketone: Negative  / Bili: Negative / Urobili: 3 mg/dL   Blood: x / Protein: 100 mg/dL / Nitrite: Negative   Leuk Esterase: Negative / RBC: 8 /HPF / WBC 1 /HPF   Sq Epi: x / Non Sq Epi: 2 /HPF / Bacteria: Negative        Lactate, Blood: 4.7 mmol/L <HH> (19 @ 04:32)  Troponin T, Serum: 5.95 ng/mL <HH> (19 @ 04:32)  Sedimentation Rate, Erythrocyte: 79 mm/Hr <H> (19 @ 04:32)      Culture - Blood (collected 2019 04:30)  Source: .Blood None  Preliminary Report (2019 16:01):    No growth to date.    Culture - Blood (collected 2019 00:25)  Source: .Blood Blood  Preliminary Report (2019 07:01):    No growth to date.    Culture - Blood (collected 2019 00:25)  Source: .Blood Blood  Preliminary Report (2019 07:01):    No growth to date.    Culture - Blood (collected 2019 13:00)  Source: .Blood Blood-Peripheral  Preliminary Report (2019 02:02):    No growth to date.      CARDIAC MARKERS ( 2019 04:32 )  x     / 5.95 ng/mL / x     / x     / x      CARDIAC MARKERS ( 2019 04:30 )  x     / 7.47 ng/mL / x     / x     / x          RADIOLOGY:  EXAM:  XR CHEST PORTABLE ROUTINE 1V            PROCEDURE DATE:  2019            INTERPRETATION:  Clinical History / Reason for exam: Shortness of breath    Comparison : Chest radiograph 2019    Technique/Positioning: Frontal view of thechest    Findings:    Support devices: Telemetry leads.    Cardiac/mediastinum/hilum: Stable.    Lung parenchyma/Pleura: There are unchanged bilateral reticular opacities    Skeleton/soft tissues: Stable    Impression:      Unchanged bilateral reticular opacities     EXAM:  2-D ECHO (TTE) COMPLETE        PROCEDURE DATE:  2019      INTERPRETATION:  REPORT:    TRANSTHORACIC ECHOCARDIOGRAM REPORT         Patient Name:   KRISTAL WHEATLEY Accession #: [id number]  Medical Rec #:  [id number]    Height:      65.7 in 167.0 cm  YOB: 1955   Weight:      141.1 lb 64.00 kg  Patient Age:    63 years     BSA:         1.72 m²  Patient Gender: M            BP:          109/71 mmHg       Date of Exam:        2019 12:52:45 PM  Referring Physician: [id number] SAUCEDO  Sonographer:         Nely JAVED  Fellow:              YULIYA Iverson Physician:   Meño Liriano M.D.    Procedure:   2D Echo/Doppler/Color Doppler Complete.  Indications: R07.9 - Chest Pain, unspecified  Diagnosis:   Chest pain, unspecified - R07.9         Summary:   1. Left ventricular ejection fraction, by visual estimation, is 35 to   40%.   2. Elevated mean left atrial pressure.   3. Moderate concentric left ventricular hypertrophy.   4. Global stairn - 7.   5. Moderate pericardial effusion.   6. Best seen in subcostal view and anteriorly.   7. Thickening and calcification of the anterior mitral valve leaflet.   8. Mild-moderate tricuspid regurgitation.   9. PSAP at least 55.  10. Mild aortic regurgitation.  11. Sclerotic aortic valve with normal opening.    PHYSICIAN INTERPRETATION:  Left Ventricle: The left ventricular internal cavity size is mildly   increased. There is moderate concentric left ventricular hypertrophy.   Left ventricular ejection fraction, by visual estimation, is 35 to 40%.   Elevated mean left atrial pressure. Global stairn - 7.  Right Ventricle: The right ventricular size is normal. RV systolic   function is mildly reduced.  Left Atrium: Severely enlarged left atrium.  Pericardium: A moderately sized pericardial effusion is present. There is   no evidence of cardiac tamponade. Best seen in subcostal view and   anteriorly.  Mitral Valve: Thickening and calcification of the anterior mitral valve   leaflet. Mild mitral valve regurgitation is seen.  Tricuspid Valve: The tricuspid valve is not well seen. The tricuspid   valve is normal in structure. Mild-moderate tricuspid regurgitation is   visualized. PSAP at least 55.  Aortic Valve: The aortic valve is trileaflet. No evidence of aortic   stenosis. Sclerotic aortic valve with normal opening. Mild aortic valve   regurgitation is seen.  Pulmonic Valve: The pulmonic valve was not well visualized. Trace   pulmonic valve regurgitation.  Aorta: The aortic root and ascending aorta are structurally normal, with   no evidence of dilitation.  Venous: The inferior vena cava is abnormal. The inferior vena cava was   dilated, with respiratory size variation less than 50%.       2D AND M-MODE MEASUREMENTS (normal ranges within parentheses):  Left                  Normal   Aorta/Left             Normal  Ventricle:                     Atrium:  IVSd        0.85 cm  (0.7-1.1) AoV Cusp       2.11  (1.5-2.6)  (Mmode):                       Separation:     cm  LVPWd       1.18 cm  (0.7-1.1) Left Atrium    4.58  (1.9-4.0)  (Mmode):                       (Mmode):        cm  LVIDd       5.88 cm  (3.4-5.7) Right  (Mmode):                       Ventricle:  LVIDs       4.15 cm            TAPSE:         1.90 cm  (Mmode):  LV FS        29.4 %   (>25%)  (Mmode):  Rel. Wall     0.40    (<0.42)  Thickness  Mm  LV Mass      141.3  Index:        g/m²  Mmode    SPECTRAL DOPPLER ANALYSIS:  Aortic Valve:  AoV VMax:    1.31 m/s AoV Area, Vmax: 2.24 cm² Vmax Indx: 1.30 cm²/m²  AoV Pk Grad: 6.8 mmHg    LVOT Vmax: 1.06 m/s  LVOT VTI:  0.13 m  LVOT Diam: 1.88 cm    Tricuspid Valve and PA/RV Systolic Pressure: TR Max Velocity: 4.14 m/s RA   Pressure:  RVSP/PASP:       [id number] Meño Liriano M.D., Electronically signed on 2019 at   4:26:42 PM              *** Final ***                    MEÑO LIRIANO MD  This document has been electronically signed. 2019 12:52PM    PHYSICAL EXAM:  GENERAL: NAD  CHEST: Bilateral equal breath sounds, no wheezing  HEART: S1/S2 no murmurs  ABDOMEN: Non distended, non tender, BS are present  Lower extremities: No lower extremities edema Quality 111:Pneumonia Vaccination Status For Older Adults: Pneumococcal Vaccination Previously Received Quality 397: Melanoma: Reporting: The pathology report includes a pT Category and statement on thickness and ulceration for pT1, mitotic rate. Quality 137: Melanoma: Continuity Of Care - Recall System: Patient information entered into a recall system that includes: target date for the next exam specified AND a process to follow up with patients regarding missed or unscheduled appointments Quality 224: Stage 0-Iic Melanoma: Overutilization Of Imaging Studies For Only Stage 0-Iic Melanoma: None of the following diagnostic imaging studies ordered: chest X-ray, CT, Ultrasound, MRI, PET, or nuclear medicine scans (ML) Quality 138: Melanoma: Coordination Of Care: A treatment plan was communicated to the physicians providing continuing care within one month of diagnosis outlining: diagnosis, tumor thickness and a plan for surgery or alternate care. Detail Level: Detailed Quality 110: Preventive Care And Screening: Influenza Immunization: Influenza Immunization Administered during Influenza season

## 2019-11-28 NOTE — PROGRESS NOTE ADULT - SUBJECTIVE AND OBJECTIVE BOX
KRISTAL WHEATLEY  63y, Male  Allergy: No Known Allergies      CHIEF COMPLAINT: SOB, fever, weakness (27 Nov 2019 13:42)      INTERVAL EVENTS/HPI  - No acute events overnight  - T(F): , Max: 101.5 (11-27-19 @ 17:00)  - Denies any worsening symptoms      ROS  10 system review - mild pain in the throat    SOCIAL HISTORY - travel to [facility] BUT took proper malarial prophylaxis and then [place] for a few days     Substance Use (  ) never used  (  ) IVDU (  ) Other:  Tobacco Usage:  (   ) never smoked   (   ) former smoker   (   ) current smoker   Alcohol Usage: (   ) social  (   ) daily use (   ) denies  Sexual History:       FH noncontributory     VITALS:  T(F): 99.5, Max: 101.5 (11-27-19 @ 17:00)  HR: 90  BP: 90/62  RR: 22Vital Signs Last 24 Hrs  T(C): 37.5 (28 Nov 2019 00:00), Max: 38.6 (27 Nov 2019 07:00)  T(F): 99.5 (28 Nov 2019 00:00), Max: 101.5 (27 Nov 2019 17:00)  HR: 90 (28 Nov 2019 04:00) (86 - 124)  BP: 90/62 (28 Nov 2019 04:00) (75/61 - 125/67)  BP(mean): 70 (28 Nov 2019 04:00) (66 - 91)  RR: 22 (28 Nov 2019 04:00) (20 - 47)  SpO2: 96% (28 Nov 2019 04:00) (94% - 100%)    PHYSICAL EXAM:  Gen: NAD, resting in bed  HEENT: Normocephalic, atraumatic  Neck: supple, no lymphadenopathy  CV: s1 s 2 +   Lungs: decreased BS   Abdomen: Soft, BS present  Ext: Warm, well perfused  Neuro: non focal, awake  Skin: no rash, no erythema      TESTS & MEASUREMENTS:                        8.9    14.76 )-----------( 259      ( 28 Nov 2019 04:32 )             29.3     11-27    136  |  101  |  20  ----------------------------<  130<H>  5.3<H>   |  20  |  1.1    Ca    8.5      27 Nov 2019 04:30  Mg     2.1     11-27    TPro  7.2  /  Alb  3.8  /  TBili  1.0  /  DBili  x   /  AST  946<H>  /  ALT  920<H>  /  AlkPhos  115  11-27      LIVER FUNCTIONS - ( 27 Nov 2019 04:30 )  Alb: 3.8 g/dL / Pro: 7.2 g/dL / ALK PHOS: 115 U/L / ALT: 920 U/L / AST: 946 U/L / GGT: x               Culture - Blood (collected 11-26-19 @ 13:00)  Source: .Blood Blood-Peripheral  Preliminary Report (11-28-19 @ 02:02):    No growth to date.        Lactate, Blood: 3.3 mmol/L (11-27-19 @ 04:30)  Lactate, Blood: 2.4 mmol/L (11-26-19 @ 13:00)      INFECTIOUS DISEASES TESTING  Hepatitis C Virus Interpretation: Nonreact (11-27-19 @ 11:09)  Hepatitis B Surface Antigen: Nonreact (11-27-19 @ 11:09)  Hepatitis C Virus Interpretation: Nonreact (11-27-19 @ 04:30)  HIV-1/2 Combo Result: Nonreact (11-27-19 @ 04:30)  HIV-1/2 Combo Result: Nonreact (11-27-19 @ 00:25)      RADIOLOGY & ADDITIONAL TESTS:        CARDIOLOGY TESTING  12 Lead ECG:   Ventricular Rate 116 BPM    Atrial Rate 127 BPM    P-R Interval 152 ms    QRS Duration 132 ms    Q-T Interval 364 ms    QTC Calculation(Bezet) 505 ms    P Axis 96 degrees    R Axis -49 degrees    T Axis 76 degrees    Diagnosis Line Undetermined rhythm  Left axis deviation  Non-specific intra-ventricular conduction block  Cannot rule out Anterior infarct , age undetermined  Lateral injury pattern  ** ** ACUTE MI / STEMI ** **  Abnormal ECG    Confirmed by Gordon Malone ([id number]) on 11/27/2019 9:09:10 AM (11-27-19 @ 07:25)  Transthoracic Echocardiogram:    EXAM:  2-D ECHO (TTE) COMPLETE        PROCEDURE DATE:  11/26/2019      INTERPRETATION:  REPORT:    TRANSTHORACIC ECHOCARDIOGRAM REPORT         Patient Name:   KRISTAL WHEATLEY Accession #: [id number]  Medical Rec #:  [id number]    Height:      65.7 in 167.0 cm  YOB: 1955   Weight:      141.1 lb 64.00 kg  Patient Age:    63 years     BSA:         1.72 m²  Patient Gender: M            BP:          109/71 mmHg       Date of Exam:        11/26/2019 12:52:45 PM  Referring Physician: [id number] NICK SAUCEDO  Sonographer:         Nely JAVED  Fellow:              YULIYA Iverson Physician:   Meño Liriano M.D.    Procedure:   2D Echo/Doppler/Color Doppler Complete.  Indications: R07.9 - Chest Pain, unspecified  Diagnosis:   Chest pain, unspecified - R07.9         Summary:   1. Left ventricular ejection fraction, by visual estimation, is 35 to   40%.   2. Elevated mean left atrial pressure.   3. Moderate concentric left ventricular hypertrophy.   4. Global stairn - 7.   5. Moderate pericardial effusion.   6. Best seen in subcostal view and anteriorly.   7. Thickening and calcification of the anterior mitral valve leaflet.   8. Mild-moderate tricuspid regurgitation.   9. PSAP at least 55.  10. Mild aortic regurgitation.  11. Sclerotic aortic valve with normal opening.    PHYSICIAN INTERPRETATION:  Left Ventricle: The left ventricular internal cavity size is mildly   increased. There is moderate concentric left ventricular hypertrophy.   Left ventricular ejection fraction, by visual estimation, is 35 to 40%.   Elevated mean left atrial pressure. Global stairn - 7.  Right Ventricle: The right ventricular size is normal. RV systolic   function is mildly reduced.  Left Atrium: Severely enlarged left atrium.  Pericardium: A moderately sized pericardial effusion is present. There is   no evidence of cardiac tamponade. Best seen in subcostal view and   anteriorly.  Mitral Valve: Thickening and calcification of the anterior mitral valve   leaflet. Mild mitral valve regurgitation is seen.  Tricuspid Valve: The tricuspid valve is not well seen. The tricuspid   valve is normal in structure. Mild-moderate tricuspid regurgitation is   visualized. PSAP at least 55.  Aortic Valve: The aortic valve is trileaflet. No evidence of aortic   stenosis. Sclerotic aortic valve with normal opening. Mild aortic valve   regurgitation is seen.  Pulmonic Valve: The pulmonic valve was not well visualized. Trace   pulmonic valve regurgitation.  Aorta: The aortic root and ascending aorta are structurally normal, with   no evidence of dilitation.  Venous: The inferior vena cava is abnormal. The inferior vena cava was   dilated, with respiratory size variation less than 50%.       2D AND M-MODE MEASUREMENTS (normal ranges within parentheses):  Left                  Normal   Aorta/Left             Normal  Ventricle:                     Atrium:  IVSd        0.85 cm  (0.7-1.1) AoV Cusp       2.11  (1.5-2.6)  (Mmode):                       Separation:     cm  LVPWd       1.18 cm  (0.7-1.1) Left Atrium    4.58  (1.9-4.0)  (Mmode):                       (Mmode):        cm  LVIDd       5.88 cm  (3.4-5.7) Right  (Mmode):                       Ventricle:  LVIDs       4.15 cm            TAPSE:         1.90 cm  (Mmode):  LV FS        29.4 %   (>25%)  (Mmode):  Rel. Wall     0.40    (<0.42)  Thickness  Mm  LV Mass      141.3  Index:        g/m²  Mmode    SPECTRAL DOPPLER ANALYSIS:  Aortic Valve:  AoV VMax:    1.31 m/s AoV Area, Vmax: 2.24 cm² Vmax Indx: 1.30 cm²/m²  AoV Pk Grad: 6.8 mmHg    LVOT Vmax: 1.06 m/s  LVOT VTI:  0.13 m  LVOT Diam: 1.88 cm    Tricuspid Valve and PA/RV Systolic Pressure: TR Max Velocity: 4.14 m/s RA   Pressure:  RVSP/PASP:       [id number] Meño Liriano M.D., Electronically signed on 11/26/2019 at   4:26:42 PM              *** Final ***                    MEÑO LIRIANO MD  This document has been electronically signed. Nov 26 2019 12:52PM             (11-26-19 @ 12:52)      MEDICATIONS  cefTRIAXone   IVPB 2000  chlorhexidine 4% Liquid 1  doxycycline IVPB 100  doxycycline IVPB   furosemide   Injectable 40  heparin  Injectable 5000  melatonin 5  sodium chloride 0.9% 1000  vancomycin  IVPB 1000  vancomycin  IVPB       ANTIBIOTICS:  cefTRIAXone   IVPB 2000 milliGRAM(s) IV Intermittent every 24 hours  doxycycline IVPB 100 milliGRAM(s) IV Intermittent every 12 hours  doxycycline IVPB      vancomycin  IVPB 1000 milliGRAM(s) IV Intermittent every 12 hours  vancomycin  IVPB

## 2019-11-28 NOTE — PROGRESS NOTE ADULT - SUBJECTIVE AND OBJECTIVE BOX
INTERVAL HPI/OVERNIGHT EVENTS:        MEDICATIONS  (STANDING):  cefTRIAXone   IVPB 2000 milliGRAM(s) IV Intermittent every 24 hours  chlorhexidine 4% Liquid 1 Application(s) Topical <User Schedule>  doxycycline IVPB 100 milliGRAM(s) IV Intermittent every 12 hours  doxycycline IVPB      furosemide   Injectable 40 milliGRAM(s) IV Push once  heparin  Injectable 5000 Unit(s) SubCutaneous every 8 hours  melatonin 5 milliGRAM(s) Oral at bedtime  sodium chloride 0.9% 1000 milliLiter(s) (100 mL/Hr) IV Continuous <Continuous>  vancomycin  IVPB 1000 milliGRAM(s) IV Intermittent every 12 hours  vancomycin  IVPB        MEDICATIONS  (PRN):  acetaminophen   Tablet .. 650 milliGRAM(s) Oral every 6 hours PRN Temp greater or equal to 38C (100.4F)      Allergies    No Known Allergies    Intolerances          Vital Signs Last 24 Hrs  T(C): 36.2 (28 Nov 2019 08:00), Max: 38.6 (27 Nov 2019 17:00)  T(F): 97.2 (28 Nov 2019 08:00), Max: 101.5 (27 Nov 2019 17:00)  HR: 92 (28 Nov 2019 08:00) (86 - 124)  BP: 99/68 (28 Nov 2019 08:00) (75/61 - 125/67)  BP(mean): 75 (28 Nov 2019 08:00) (66 - 83)  RR: 33 (28 Nov 2019 08:00) (20 - 47)  SpO2: 97% (28 Nov 2019 08:00) (94% - 100%)    11-27-19 @ 07:01  -  11-28-19 @ 07:00  --------------------------------------------------------  IN: 2100 mL / OUT: 150 mL / NET: 1950 mL        Physical Exam    GENERAL: In no apparent distress, well nourished, and hydrated.  HEAD:  Atraumatic, Normocephalic  EYES: EOMI, PERRLA, conjunctiva and sclera clear  ENMT: No tonsillar erythema, exudates, or enlargements; ist mucous membranes, Good dentition, No lesions  NECK: Supple and normal thyroid.  No JVD or carotid bruit.  Carotid pulse is 2+ bilaterally.  HEART: Regular rate and rhythm; No murmurs, rubs, or gallops.  PULMONARY: Clear to auscultation and perfusion.  No rales, wheezing, or rhonchi bilaterally.  ABDOMEN: Soft, Nontender, Nondistended; Bowel sounds present  EXTREMITIES:  2+ Peripheral Pulses, No clubbing, cyanosis, or edema  LYMPH: No lymphadenopathy noted  NEUROLOGICAL: Grossly nonfocal    LABS:                        8.9    14.76 )-----------( 259      ( 28 Nov 2019 04:32 )             29.3     11-28    130<L>  |  95<L>  |  18  ----------------------------<  116<H>  4.9   |  15<L>  |  1.0    Ca    7.9<L>      28 Nov 2019 04:32  Mg     3.8     11-28    TPro  6.9  /  Alb  3.6  /  TBili  1.7<H>  /  DBili  x   /  AST  1576<H>  /  ALT  1621<H>  /  AlkPhos  118<H>  11-28          RADIOLOGY & ADDITIONAL TESTS:

## 2019-11-28 NOTE — PROGRESS NOTE ADULT - SUBJECTIVE AND OBJECTIVE BOX
KRISTAL WHEATLEY  [facility] ([facility])            Patient was evaluated and examined  by bedside, c/o dyspnea, mild cough, feels very weak, no chest pain                REVIEW OF SYSTEMS:  please see pertinent positives mentioned above, all other 12 ROS negative      T(C): , Max: 38.6 (11-27-19 @ 17:00)  HR: 79 (11-28-19 @ 14:00)  BP: 101/65 (11-28-19 @ 14:00)  RR: 40 (11-28-19 @ 14:00)  SpO2: 90% (11-28-19 @ 14:00)  CAPILLARY BLOOD GLUCOSE          PHYSICAL EXAM:  General: NAD, AAOX3, patient is sitting  in a chair  HEENT: AT, NC, Supple, NO JVD, NO CB  Lungs: mild decreased breath sounds B/L, no wheezing, no rhonchi  CVS: normal S1, S2, RRR, NO M/G/R  Abdomen: soft, bowel sounds present, non-tender, non-distended  Extremities: no edema, no clubbing, no cyanosis, positive peripheral pulses b/l  Neuro: no acute focal neurological deficits, generalized body weakness.  Skin: no rash, no ecchymosis      LAB  CBC  Date: 11-28-19 @ 04:32  Mean cell Uczmklctwn29.7  Mean cell Hemoglobin Conc30.4  Mean cell Volum 97.7  Platelet count-Automate 259  RBC Count 3.00  Red Cell Distrib Width12.6  WBC Count14.76  % Albumin, Urine--  Hematocrit 29.3  Hemoglobin 8.9  CBC  Date: 11-27-19 @ 04:30  Mean cell Hagrixjxgd57.0  Mean cell Hemoglobin Conc31.3  Mean cell Volum 95.9  Platelet count-Automate 315  RBC Count 3.40  Red Cell Distrib Width12.6  WBC Count13.44  % Albumin, Urine--  Hematocrit 32.6  Hemoglobin 10.2  CBC  Date: 11-26-19 @ 09:20  Mean cell Jninuoxduk02.4  Mean cell Hemoglobin Conc31.4  Mean cell Volum 96.9  Platelet count-Automate 247  RBC Count 3.19  Red Cell Distrib Width12.4  WBC Count7.62  % Albumin, Urine--  Hematocrit 30.9  Hemoglobin 9.7    BMP  11-28-19 @ 04:32  Blood Gas Arterial-Calcium,Ionized--  Blood Urea Nitrogen, Serum 18 mg/dL [10 - 20]  Carbon Dioxide, Serum15 mmol/L<L> [17 - 32]  Chloride, Serum95 mmol/L<L> [98 - 110]  Creatinie, Serum1.0 mg/dL [0.7 - 1.5]  Glucose, Mchgj547 mg/dL<H> [70 - 99]  Potassium, Serum4.9 mmol/L [3.5 - 5.0]  Sodium, Serum 130 mmol/L<L> [135 - 146]  BMP  11-27-19 @ 04:30  Blood Gas Arterial-Calcium,Ionized--  Blood Urea Nitrogen, Serum 20 mg/dL [10 - 20]  Carbon Dioxide, Serum20 mmol/L [17 - 32]  Chloride, Alwgt969 mmol/L [98 - 110]  Creatinie, Serum1.1 mg/dL [0.7 - 1.5]  Glucose, Igtew204 mg/dL<H> [70 - 99]  Potassium, Serum5.3 mmol/L<H> [3.5 - 5.0]  Sodium, Serum 136 mmol/L [135 - 146]  BMP  11-26-19 @ 09:20  Blood Gas Arterial-Calcium,Ionized--  Blood Urea Nitrogen, Serum 23 mg/dL<H> [10 - 20]  Carbon Dioxide, Serum20 mmol/L [17 - 32]  Chloride, Serum99 mmol/L [98 - 110]  Creatinie, Serum1.4 mg/dL [0.7 - 1.5]  Glucose, Abboa767 mg/dL<H> [70 - 99]  Potassium, Serum4.9 mmol/L [3.5 - 5.0] [Hemolyzed. Interpret with caution]  Sodium, Serum 135 mmol/L [135 - 146]              Microbiology:    Culture - Blood (collected 11-27-19 @ 00:25)  Source: .Blood Blood  Preliminary Report (11-28-19 @ 07:01):    No growth to date.    Culture - Blood (collected 11-27-19 @ 00:25)  Source: .Blood Blood  Preliminary Report (11-28-19 @ 07:01):    No growth to date.    Culture - Blood (collected 11-26-19 @ 13:00)  Source: .Blood Blood-Peripheral  Preliminary Report (11-28-19 @ 02:02):    No growth to date.          Medications:  acetaminophen   Tablet .. 650 milliGRAM(s) Oral every 6 hours PRN  aspirin 325 milliGRAM(s) Oral daily  cefTRIAXone   IVPB 2000 milliGRAM(s) IV Intermittent every 24 hours  chlorhexidine 4% Liquid 1 Application(s) Topical <User Schedule>  doxycycline IVPB 100 milliGRAM(s) IV Intermittent every 12 hours  doxycycline IVPB      furosemide   Injectable 40 milliGRAM(s) IV Push once  heparin  Injectable 5000 Unit(s) SubCutaneous every 8 hours  melatonin 5 milliGRAM(s) Oral at bedtime  vancomycin  IVPB 1000 milliGRAM(s) IV Intermittent every 12 hours  vancomycin  IVPB            Assessment and Plan:  62 yo male with PMHx of HTN and malaria, presented with SOB and palpitations.     #Weakness, cyclical fevers, Dyspnea -dx. with acute Myocarditis  -EKG: Sinus rhythm with inferolateral ST elevations and PVCs;   -CARDIAC MARKERS ( 28 Nov 2019 04:32 )  x     / 5.95 ng/mL / x     / x     / x      CARDIAC MARKERS ( 27 Nov 2019 04:30 )  x     / 7.47 ng/mL / x     / x     / x          -S/P Emergent LHC- Normal coronaries.   -s/p 2 d echo-   < from: Transthoracic Echocardiogram (11.26.19 @ 12:52) >  Summary:   1. Left ventricular ejection fraction, by visual estimation, is 35 to   40%.   2. Elevated mean left atrial pressure.   3. Moderate concentric left ventricular hypertrophy.   4. Global stairn - 7.   5. Moderate pericardial effusion.   6. Best seen in subcostal view and anteriorly.   7. Thickening and calcification of the anterior mitral valve leaflet.   8. Mild-moderate tricuspid regurgitation.   9. PSAP at least 55.  10. Mild aortic regurgitation.  11. Sclerotic aortic valve with normal opening.    < end of copied text >        -peripheral smear x 1 - no parasites  -very high LDH- trending up  -acute Rhabdomyolysis   -CXR today -increasing bibasilar opacities and effusions.  -for possible infectious etiology - pt. continued on IV  Ceftriaxone 2g q24h IV, IV Vanco, IV Doxycycline  -blood cxs- preliminary negative.  - HIV - negative  - f/up  quantiferon gold   -very high   - very high ESR, CRP   -Cardiology and EP on board.  -MRI heart was ordered - f/up results  -will start high dose ASA tx. trial.  -continue oxygen supportive tx.     # Acute transaminitis- worsening liver function due to ? rhabdo  - obtain US liver  - consult GI  -hep profile negative    # Acute Lactic acidosis- continue to monitor lactic acid level closely    # Acute Rhabdomyolysis- daily CK, no IVF due to worsening pulmonary congestion.    #anemia Macrocytic- obtain anemia work up, haptoglobin level, mart test    # elevated LDH due to acute inflammatory state- continue daily monitoring with above tx.     # Hypermagnesemia- d/c IV magnesium tx., f/up am Magnesium level    #Progress Note Handoff:  with worsening pulmonary congestion, rising LFT's, Lactic acidosis, patient will need further cardiac work up with possible cardiac tissue biopsy to obtain etiology of myocarditis, will need to transfer pt. to tertiary care facility. if we can obtain stat MRI of heart here, f/up results.  Start pt. on ASA tx. , consult GI for worsening transaminitis, repeat CK level today, lactic acid level to be repeated in 6 hours. obtain serum haptoglobin level.  patient remains critically ill.  Family discussion: medical plan of tx. d/w pt. Disposition: possible transfer to tertiary care facility for further diagnostic work up of new onset myocarditis with  acute cardiomyopathy. KRISTAL WHEATLEY  [facility]  A ([facility])            Patient was evaluated and examined  by bedside, c/o dyspnea, mild cough, feels very weak, no chest pain                REVIEW OF SYSTEMS:  please see pertinent positives mentioned above, all other 12 ROS negative      T(C): , Max: 38.6 (11-27-19 @ 17:00)  HR: 79 (11-28-19 @ 14:00)  BP: 101/65 (11-28-19 @ 14:00)  RR: 40 (11-28-19 @ 14:00)  SpO2: 90% (11-28-19 @ 14:00)  CAPILLARY BLOOD GLUCOSE          PHYSICAL EXAM:  General: NAD, AAOX3, patient is sitting  in a chair  HEENT: AT, NC, Supple, NO JVD, NO CB  Lungs: mild decreased breath sounds B/L, no wheezing, no rhonchi  CVS: normal S1, S2, RRR, NO M/G/R  Abdomen: soft, bowel sounds present, non-tender, non-distended  Extremities: no edema, no clubbing, no cyanosis, positive peripheral pulses b/l  Neuro: no acute focal neurological deficits, generalized body weakness.  Skin: no rash, no ecchymosis      LAB  CBC  Date: 11-28-19 @ 04:32  Mean cell Fxqbpfiujb84.7  Mean cell Hemoglobin Conc30.4  Mean cell Volum 97.7  Platelet count-Automate 259  RBC Count 3.00  Red Cell Distrib Width12.6  WBC Count14.76  % Albumin, Urine--  Hematocrit 29.3  Hemoglobin 8.9  CBC  Date: 11-27-19 @ 04:30  Mean cell Btruxdzoja46.0  Mean cell Hemoglobin Conc31.3  Mean cell Volum 95.9  Platelet count-Automate 315  RBC Count 3.40  Red Cell Distrib Width12.6  WBC Count13.44  % Albumin, Urine--  Hematocrit 32.6  Hemoglobin 10.2  CBC  Date: 11-26-19 @ 09:20  Mean cell Qbxbsqjojv29.4  Mean cell Hemoglobin Conc31.4  Mean cell Volum 96.9  Platelet count-Automate 247  RBC Count 3.19  Red Cell Distrib Width12.4  WBC Count7.62  % Albumin, Urine--  Hematocrit 30.9  Hemoglobin 9.7    BMP  11-28-19 @ 04:32  Blood Gas Arterial-Calcium,Ionized--  Blood Urea Nitrogen, Serum 18 mg/dL [10 - 20]  Carbon Dioxide, Serum15 mmol/L<L> [17 - 32]  Chloride, Serum95 mmol/L<L> [98 - 110]  Creatinie, Serum1.0 mg/dL [0.7 - 1.5]  Glucose, Vejvr092 mg/dL<H> [70 - 99]  Potassium, Serum4.9 mmol/L [3.5 - 5.0]  Sodium, Serum 130 mmol/L<L> [135 - 146]  BMP  11-27-19 @ 04:30  Blood Gas Arterial-Calcium,Ionized--  Blood Urea Nitrogen, Serum 20 mg/dL [10 - 20]  Carbon Dioxide, Serum20 mmol/L [17 - 32]  Chloride, Ajadh656 mmol/L [98 - 110]  Creatinie, Serum1.1 mg/dL [0.7 - 1.5]  Glucose, Mpaps929 mg/dL<H> [70 - 99]  Potassium, Serum5.3 mmol/L<H> [3.5 - 5.0]  Sodium, Serum 136 mmol/L [135 - 146]  BMP  11-26-19 @ 09:20  Blood Gas Arterial-Calcium,Ionized--  Blood Urea Nitrogen, Serum 23 mg/dL<H> [10 - 20]  Carbon Dioxide, Serum20 mmol/L [17 - 32]  Chloride, Serum99 mmol/L [98 - 110]  Creatinie, Serum1.4 mg/dL [0.7 - 1.5]  Glucose, Oaqba282 mg/dL<H> [70 - 99]  Potassium, Serum4.9 mmol/L [3.5 - 5.0] [Hemolyzed. Interpret with caution]  Sodium, Serum 135 mmol/L [135 - 146]              Microbiology:    Culture - Blood (collected 11-27-19 @ 00:25)  Source: .Blood Blood  Preliminary Report (11-28-19 @ 07:01):    No growth to date.    Culture - Blood (collected 11-27-19 @ 00:25)  Source: .Blood Blood  Preliminary Report (11-28-19 @ 07:01):    No growth to date.    Culture - Blood (collected 11-26-19 @ 13:00)  Source: .Blood Blood-Peripheral  Preliminary Report (11-28-19 @ 02:02):    No growth to date.          Medications:  acetaminophen   Tablet .. 650 milliGRAM(s) Oral every 6 hours PRN  aspirin 325 milliGRAM(s) Oral daily  cefTRIAXone   IVPB 2000 milliGRAM(s) IV Intermittent every 24 hours  chlorhexidine 4% Liquid 1 Application(s) Topical <User Schedule>  doxycycline IVPB 100 milliGRAM(s) IV Intermittent every 12 hours  doxycycline IVPB      furosemide   Injectable 40 milliGRAM(s) IV Push once  heparin  Injectable 5000 Unit(s) SubCutaneous every 8 hours  melatonin 5 milliGRAM(s) Oral at bedtime  vancomycin  IVPB 1000 milliGRAM(s) IV Intermittent every 12 hours  vancomycin  IVPB            Assessment and Plan:  62 yo male with PMHx of HTN and malaria, presented with SOB and palpitations.     #Weakness, cyclical fevers, Dyspnea -dx. with acute Myocarditis  -EKG: Sinus rhythm with inferolateral ST elevations and PVCs;   -CARDIAC MARKERS ( 28 Nov 2019 04:32 )  x     / 5.95 ng/mL / x     / x     / x      CARDIAC MARKERS ( 27 Nov 2019 04:30 )  x     / 7.47 ng/mL / x     / x     / x          -S/P Emergent LHC- Normal coronaries.   -s/p 2 d echo-   < from: Transthoracic Echocardiogram (11.26.19 @ 12:52) >  Summary:   1. Left ventricular ejection fraction, by visual estimation, is 35 to   40%.   2. Elevated mean left atrial pressure.   3. Moderate concentric left ventricular hypertrophy.   4. Global stairn - 7.   5. Moderate pericardial effusion.   6. Best seen in subcostal view and anteriorly.   7. Thickening and calcification of the anterior mitral valve leaflet.   8. Mild-moderate tricuspid regurgitation.   9. PSAP at least 55.  10. Mild aortic regurgitation.  11. Sclerotic aortic valve with normal opening.    < end of copied text >        -peripheral smear x 1 - no parasites  -very high LDH- trending up  -acute Rhabdomyolysis   -CXR today -increasing bibasilar opacities and effusions.  -for possible infectious etiology - pt. continued on IV  Ceftriaxone 2g q24h IV, IV Vanco, IV Doxycycline  -blood cxs- preliminary negative.  - HIV - negative  - f/up  quantiferon gold   -very high   - very high ESR, CRP   -Cardiology and EP on board.  -MRI heart was ordered - f/up results  -will start high dose ASA tx. trial.  -continue oxygen supportive tx.   -after a verbal discussion with EP specialist since patient's cardiomyopathy is clinically worse with worsening pulmonary congestion, highly possible that pt. has autoimmune etiology - will start a trial of high dose Steroids today, with close monitoring. consult Rheumatology/Immunology specialist.     # Acute transaminitis- worsening liver function due to ? rhabdo  - obtain US liver  - consult GI  -hep profile negative    # Acute Lactic acidosis- continue to monitor lactic acid level closely    # Acute Rhabdomyolysis- daily CK, no IVF due to worsening pulmonary congestion.    #anemia Macrocytic- obtain anemia work up, haptoglobin level, mart test    # elevated LDH due to acute inflammatory state- continue daily monitoring with above tx.     # Hypermagnesemia- d/c IV magnesium tx., f/up am Magnesium level    #Progress Note Handoff:  with worsening pulmonary congestion, rising LFT's, Lactic acidosis,  if we can obtain stat MRI of heart here, f/up results.  Start pt. on ASA tx. , steroids trial ,consult Rheumatology specialist, consult GI for worsening transaminitis, repeat CK level today, lactic acid level to be repeated in 6 hours. obtain serum haptoglobin level.  patient remains critically ill.  Family discussion: medical plan of tx. d/w pt. Disposition: patient remains critically ill, requiring CCU care.

## 2019-11-28 NOTE — PROGRESS NOTE ADULT - ASSESSMENT
62 yo male with PMHx of HTN and malaria, presented with SOB and palpitations.     #Weakness, cyclical fevers, Dyspnea -r/o myopericarditis of infectious etiology  -EKG: Sinus rhythm with inferolateral ST elevations and PVCs; Troponin 10.98; CXR unremarkable  -S/P Emergent LHC- Normal coronaries.   -F/U 2D echo, trend CE; F/u lipid profile,HbA1c, TSH  -Rule out malaria as the possible infectious cause. F/U peripheral blood smear for parasites  -f/u hemolysis panel, retic count, LDH, ESR, Blood cx  -ID follow up, They recommended: - send blood cultures  - Ceftriaxone 2g q24h IV, d/c if BCX NG  - repeat LDH (hemolyzed)  - HIV ab/ag CMIA, quantiferon gold   - ESR, CRP   - If + parasite smear---> would start IV artesunate will need to call [facility] hotline  -We called  to send the malaria testing. It was negative on the periphral blood smear. We sent a new one.   -Echo showed reduced EF, we called HF specialist, EP and ID and they are following up   -ECHO f/u tonight  -MRI heart was ordered   -Dr. Mcclendon recommended starting aspirin treatment so we started 325 mg PO daily. Hydrocortisone 100 mg IV q8hrs      #HTN- Monitor for now; DASH diet    #DVT ppx: Heparin sq  #GI ppx: Not indicated  #Diet: NPO for cath; DASH diet post cath  #Activity: IAT  #FULL CODE  #DIspo: CCU

## 2019-11-28 NOTE — PROGRESS NOTE ADULT - ASSESSMENT
Critically ill patient  Acute Myopericarditis  Cardiomyopathy  Frequent PVCs    EKG SR with acute perocarditis QT impoved but still prolonged  Long QT  - improving but still prolonging  - cont Mg drip  -Mg 3.8 appropriate  - avoid QT prolonging drugs    -Cardiac MRI to assess extent of acute myocarditis and LGE  -check serologies for Coxsackie B, EBV, toxoplasma gondii and trypanosoma  -Trend ESR, CRP, cardiac enzymes ;  HIV negtaive  - trop are decreasing; heb C negative  -Daily ECG

## 2019-11-29 VITALS
HEART RATE: 64 BPM | RESPIRATION RATE: 23 BRPM | OXYGEN SATURATION: 98 % | SYSTOLIC BLOOD PRESSURE: 104 MMHG | DIASTOLIC BLOOD PRESSURE: 72 MMHG | TEMPERATURE: 99 F

## 2019-11-29 DIAGNOSIS — I46.9 CARDIAC ARREST, CAUSE UNSPECIFIED: ICD-10-CM

## 2019-11-29 DIAGNOSIS — J96.90 RESPIRATORY FAILURE, UNSPECIFIED, UNSPECIFIED WHETHER WITH HYPOXIA OR HYPERCAPNIA: ICD-10-CM

## 2019-11-29 LAB
ALBUMIN SERPL ELPH-MCNC: 3.1 G/DL — LOW (ref 3.5–5.2)
ALP SERPL-CCNC: 118 U/L — HIGH (ref 30–115)
ALT FLD-CCNC: 3147 U/L — HIGH (ref 0–41)
ANION GAP SERPL CALC-SCNC: 28 MMOL/L — HIGH (ref 7–14)
ANISOCYTOSIS BLD QL: SLIGHT — SIGNIFICANT CHANGE UP
APTT BLD: 33.4 SEC — SIGNIFICANT CHANGE UP (ref 27–39.2)
APTT BLD: 34 SEC — SIGNIFICANT CHANGE UP (ref 27–39.2)
APTT BLD: 34.2 SEC — SIGNIFICANT CHANGE UP (ref 27–39.2)
AST SERPL-CCNC: 475 U/L — HIGH (ref 0–41)
BASE EXCESS BLDA CALC-SCNC: -11.5 MMOL/L — LOW (ref -2–2)
BASE EXCESS BLDA CALC-SCNC: -14 MMOL/L — LOW (ref -2–2)
BASE EXCESS BLDV CALC-SCNC: -10.8 MMOL/L — LOW (ref -2–2)
BASOPHILS # BLD AUTO: 0 K/UL — SIGNIFICANT CHANGE UP (ref 0–0.2)
BASOPHILS # BLD AUTO: 0.09 K/UL — SIGNIFICANT CHANGE UP (ref 0–0.2)
BASOPHILS NFR BLD AUTO: 0 % — SIGNIFICANT CHANGE UP (ref 0–1)
BASOPHILS NFR BLD AUTO: 0.3 % — SIGNIFICANT CHANGE UP (ref 0–1)
BILIRUB SERPL-MCNC: 3 MG/DL — HIGH (ref 0.2–1.2)
BLD GP AB SCN SERPL QL: SIGNIFICANT CHANGE UP
BLD GP AB SCN SERPL QL: SIGNIFICANT CHANGE UP
BUN SERPL-MCNC: 25 MG/DL — HIGH (ref 10–20)
C BURNET PH1 + PH2 IGG+IGM SER-IMP: SIGNIFICANT CHANGE UP
C BURNET PH1 IGG TITR FLD: SIGNIFICANT CHANGE UP
C BURNET PH1 IGG TITR FLD: SIGNIFICANT CHANGE UP
C BURNET PH1 IGM TITR FLD: SIGNIFICANT CHANGE UP
C BURNET PH1 IGM TITR FLD: SIGNIFICANT CHANGE UP
CA-I SERPL-SCNC: 0.91 MMOL/L — LOW (ref 1.12–1.3)
CALCIUM SERPL-MCNC: 6.8 MG/DL — LOW (ref 8.5–10.1)
CHLORIDE SERPL-SCNC: 85 MMOL/L — LOW (ref 98–110)
CK MB CFR SERPL CALC: 34.4 NG/ML — HIGH (ref 0.6–6.3)
CK MB CFR SERPL CALC: 39.4 NG/ML — HIGH (ref 0.6–6.3)
CK SERPL-CCNC: 1008 U/L — HIGH (ref 0–225)
CK SERPL-CCNC: 986 U/L — HIGH (ref 0–225)
CO2 SERPL-SCNC: 16 MMOL/L — LOW (ref 17–32)
CREAT SERPL-MCNC: 1.5 MG/DL — SIGNIFICANT CHANGE UP (ref 0.7–1.5)
D DIMER BLD IA.RAPID-MCNC: 6500 NG/ML DDU — HIGH (ref 0–230)
D DIMER BLD IA.RAPID-MCNC: 7500 NG/ML DDU — HIGH (ref 0–230)
DIR ANTIGLOB POLYSPECIFIC INTERPRETATION: SIGNIFICANT CHANGE UP
EOSINOPHIL # BLD AUTO: 0 K/UL — SIGNIFICANT CHANGE UP (ref 0–0.7)
EOSINOPHIL # BLD AUTO: 0.01 K/UL — SIGNIFICANT CHANGE UP (ref 0–0.7)
EOSINOPHIL # BLD: 0 /UL — SIGNIFICANT CHANGE UP (ref 0–450)
EOSINOPHIL NFR BLD AUTO: 0 % — SIGNIFICANT CHANGE UP (ref 0–8)
EOSINOPHIL NFR BLD AUTO: 0 % — SIGNIFICANT CHANGE UP (ref 0–8)
FIBRINOGEN PPP-MCNC: 440 MG/DL — SIGNIFICANT CHANGE UP (ref 204.4–570.6)
FIBRINOGEN PPP-MCNC: 480 MG/DL — SIGNIFICANT CHANGE UP (ref 204.4–570.6)
GAS PNL BLDA: SIGNIFICANT CHANGE UP
GAS PNL BLDV: 131 MMOL/L — LOW (ref 136–145)
GAS PNL BLDV: SIGNIFICANT CHANGE UP
GIANT PLATELETS BLD QL SMEAR: PRESENT — SIGNIFICANT CHANGE UP
GLUCOSE SERPL-MCNC: 192 MG/DL — HIGH (ref 70–99)
HCO3 BLDA-SCNC: 12 MMOL/L — LOW (ref 23–27)
HCO3 BLDA-SCNC: 16 MMOL/L — LOW (ref 23–27)
HCO3 BLDV-SCNC: 17 MMOL/L — LOW (ref 22–29)
HCT VFR BLD CALC: 21.4 % — LOW (ref 42–52)
HCT VFR BLD CALC: 24 % — LOW (ref 42–52)
HCT VFR BLD CALC: 24.9 % — LOW (ref 42–52)
HCT VFR BLDA CALC: 24.5 % — LOW (ref 34–44)
HEV AB FLD QL: NEGATIVE — SIGNIFICANT CHANGE UP
HGB BLD CALC-MCNC: 8 G/DL — LOW (ref 14–18)
HGB BLD-MCNC: 6.8 G/DL — CRITICAL LOW (ref 14–18)
HGB BLD-MCNC: 7.5 G/DL — LOW (ref 14–18)
HGB BLD-MCNC: 7.5 G/DL — LOW (ref 14–18)
HOROWITZ INDEX BLDA+IHG-RTO: 100 — SIGNIFICANT CHANGE UP
HOROWITZ INDEX BLDA+IHG-RTO: 70 — SIGNIFICANT CHANGE UP
IMM GRANULOCYTES NFR BLD AUTO: 11.4 % — HIGH (ref 0.1–0.3)
INR BLD: 2.34 RATIO — HIGH (ref 0.65–1.3)
INR BLD: 2.37 RATIO — HIGH (ref 0.65–1.3)
INR BLD: 2.39 RATIO — HIGH (ref 0.65–1.3)
LACTATE BLDV-MCNC: 12.1 MMOL/L — HIGH (ref 0.5–1.6)
LACTATE SERPL-SCNC: 13.8 MMOL/L — CRITICAL HIGH (ref 0.7–2)
LDH SERPL L TO P-CCNC: >2500 U/L — HIGH (ref 50–242)
LDH SERPL L TO P-CCNC: >2500 U/L — HIGH (ref 50–242)
LYMPHOCYTES # BLD AUTO: 0.66 K/UL — LOW (ref 1.2–3.4)
LYMPHOCYTES # BLD AUTO: 1.54 K/UL — SIGNIFICANT CHANGE UP (ref 1.2–3.4)
LYMPHOCYTES # BLD AUTO: 2.6 % — LOW (ref 20.5–51.1)
LYMPHOCYTES # BLD AUTO: 5.5 % — LOW (ref 20.5–51.1)
MACROCYTES BLD QL: SLIGHT — SIGNIFICANT CHANGE UP
MAGNESIUM SERPL-MCNC: 3.9 MG/DL — CRITICAL HIGH (ref 1.8–2.4)
MANUAL SMEAR VERIFICATION: SIGNIFICANT CHANGE UP
MCHC RBC-ENTMCNC: 30.1 G/DL — LOW (ref 32–37)
MCHC RBC-ENTMCNC: 30.2 PG — SIGNIFICANT CHANGE UP (ref 27–31)
MCHC RBC-ENTMCNC: 30.4 PG — SIGNIFICANT CHANGE UP (ref 27–31)
MCHC RBC-ENTMCNC: 30.8 PG — SIGNIFICANT CHANGE UP (ref 27–31)
MCHC RBC-ENTMCNC: 31.3 G/DL — LOW (ref 32–37)
MCHC RBC-ENTMCNC: 31.8 G/DL — LOW (ref 32–37)
MCV RBC AUTO: 100.4 FL — HIGH (ref 80–94)
MCV RBC AUTO: 96.8 FL — HIGH (ref 80–94)
MCV RBC AUTO: 97.2 FL — HIGH (ref 80–94)
METAMYELOCYTES # FLD: 1.7 % — HIGH (ref 0–0)
MONOCYTES # BLD AUTO: 0.43 K/UL — SIGNIFICANT CHANGE UP (ref 0.1–0.6)
MONOCYTES # BLD AUTO: 1.67 K/UL — HIGH (ref 0.1–0.6)
MONOCYTES NFR BLD AUTO: 1.7 % — SIGNIFICANT CHANGE UP (ref 1.7–9.3)
MONOCYTES NFR BLD AUTO: 6 % — SIGNIFICANT CHANGE UP (ref 1.7–9.3)
MYELOCYTES NFR BLD: 5.2 % — HIGH (ref 0–0)
NEUTROPHILS # BLD AUTO: 21.53 K/UL — HIGH (ref 1.4–6.5)
NEUTROPHILS # BLD AUTO: 22.4 K/UL — HIGH (ref 1.4–6.5)
NEUTROPHILS NFR BLD AUTO: 76.8 % — HIGH (ref 42.2–75.2)
NEUTROPHILS NFR BLD AUTO: 82.8 % — HIGH (ref 42.2–75.2)
NEUTS BAND # BLD: 6 % — SIGNIFICANT CHANGE UP (ref 0–6)
NRBC # BLD: 2 /100 — HIGH (ref 0–0)
NRBC # BLD: 5 /100 WBCS — HIGH (ref 0–0)
NRBC # BLD: 7 /100 WBCS — HIGH (ref 0–0)
NRBC # BLD: SIGNIFICANT CHANGE UP /100 WBCS (ref 0–0)
PCO2 BLDA: 28 MMHG — LOW (ref 38–42)
PCO2 BLDA: 40 MMHG — SIGNIFICANT CHANGE UP (ref 38–42)
PCO2 BLDV: 49 MMHG — SIGNIFICANT CHANGE UP (ref 41–51)
PH BLDA: 7.2 — LOW (ref 7.38–7.42)
PH BLDA: 7.24 — LOW (ref 7.38–7.42)
PH BLDV: 7.16 — LOW (ref 7.26–7.43)
PHOSPHATE SERPL-MCNC: 5.8 MG/DL — HIGH (ref 2.1–4.9)
PLAT MORPH BLD: NORMAL — SIGNIFICANT CHANGE UP
PLATELET # BLD AUTO: 198 K/UL — SIGNIFICANT CHANGE UP (ref 130–400)
PLATELET # BLD AUTO: 203 K/UL — SIGNIFICANT CHANGE UP (ref 130–400)
PLATELET # BLD AUTO: 225 K/UL — SIGNIFICANT CHANGE UP (ref 130–400)
PO2 BLDA: 91 MMHG — SIGNIFICANT CHANGE UP (ref 78–95)
PO2 BLDA: 94 MMHG — SIGNIFICANT CHANGE UP (ref 78–95)
PO2 BLDV: 38 MMHG — SIGNIFICANT CHANGE UP (ref 20–40)
POIKILOCYTOSIS BLD QL AUTO: SIGNIFICANT CHANGE UP
POTASSIUM BLDV-SCNC: 3.7 MMOL/L — SIGNIFICANT CHANGE UP (ref 3.3–5.6)
POTASSIUM SERPL-MCNC: 4.2 MMOL/L — SIGNIFICANT CHANGE UP (ref 3.5–5)
POTASSIUM SERPL-SCNC: 4.2 MMOL/L — SIGNIFICANT CHANGE UP (ref 3.5–5)
PROT SERPL-MCNC: 5.8 G/DL — LOW (ref 6–8)
PROTHROM AB SERPL-ACNC: 26.7 SEC — HIGH (ref 9.95–12.87)
PROTHROM AB SERPL-ACNC: 27 SEC — HIGH (ref 9.95–12.87)
PROTHROM AB SERPL-ACNC: 27.2 SEC — HIGH (ref 9.95–12.87)
RAPID RVP RESULT: SIGNIFICANT CHANGE UP
RBC # BLD: 2.21 M/UL — LOW (ref 4.7–6.1)
RBC # BLD: 2.41 M/UL — LOW (ref 4.7–6.1)
RBC # BLD: 2.47 M/UL — LOW (ref 4.7–6.1)
RBC # BLD: 2.48 M/UL — LOW (ref 4.7–6.1)
RBC # FLD: 12.7 % — SIGNIFICANT CHANGE UP (ref 11.5–14.5)
RBC # FLD: 12.8 % — SIGNIFICANT CHANGE UP (ref 11.5–14.5)
RBC # FLD: 12.9 % — SIGNIFICANT CHANGE UP (ref 11.5–14.5)
RBC BLD AUTO: NORMAL — SIGNIFICANT CHANGE UP
RETICS #: 67 K/UL — SIGNIFICANT CHANGE UP (ref 25–125)
RETICS/RBC NFR: 2.8 % — HIGH (ref 0.5–1.5)
SAO2 % BLDA: 94 % — SIGNIFICANT CHANGE UP (ref 94–98)
SAO2 % BLDA: 95 % — SIGNIFICANT CHANGE UP (ref 94–98)
SAO2 % BLDV: 50 % — SIGNIFICANT CHANGE UP
SODIUM SERPL-SCNC: 129 MMOL/L — LOW (ref 135–146)
TROPONIN T SERPL-MCNC: 2.81 NG/ML — CRITICAL HIGH
TROPONIN T SERPL-MCNC: 3.85 NG/ML — CRITICAL HIGH
WBC # BLD: 24.9 K/UL — HIGH (ref 4.8–10.8)
WBC # BLD: 25.22 K/UL — HIGH (ref 4.8–10.8)
WBC # BLD: 28.05 K/UL — HIGH (ref 4.8–10.8)
WBC # FLD AUTO: 24.9 K/UL — HIGH (ref 4.8–10.8)
WBC # FLD AUTO: 25.22 K/UL — HIGH (ref 4.8–10.8)
WBC # FLD AUTO: 28.05 K/UL — HIGH (ref 4.8–10.8)

## 2019-11-29 PROCEDURE — 36556 INSERT NON-TUNNEL CV CATH: CPT

## 2019-11-29 PROCEDURE — 99233 SBSQ HOSP IP/OBS HIGH 50: CPT | Mod: 25

## 2019-11-29 PROCEDURE — 93306 TTE W/DOPPLER COMPLETE: CPT | Mod: 26

## 2019-11-29 PROCEDURE — 71045 X-RAY EXAM CHEST 1 VIEW: CPT | Mod: 26

## 2019-11-29 PROCEDURE — 93503 INSERT/PLACE HEART CATHETER: CPT

## 2019-11-29 PROCEDURE — 99233 SBSQ HOSP IP/OBS HIGH 50: CPT

## 2019-11-29 PROCEDURE — 36620 INSERTION CATHETER ARTERY: CPT

## 2019-11-29 PROCEDURE — 99221 1ST HOSP IP/OBS SF/LOW 40: CPT | Mod: 25

## 2019-11-29 PROCEDURE — 99222 1ST HOSP IP/OBS MODERATE 55: CPT

## 2019-11-29 PROCEDURE — 93010 ELECTROCARDIOGRAM REPORT: CPT

## 2019-11-29 PROCEDURE — 76705 ECHO EXAM OF ABDOMEN: CPT | Mod: 26

## 2019-11-29 PROCEDURE — 99232 SBSQ HOSP IP/OBS MODERATE 35: CPT

## 2019-11-29 PROCEDURE — 93503 INSERT/PLACE HEART CATHETER: CPT | Mod: 59

## 2019-11-29 RX ORDER — MAGNESIUM SULFATE 500 MG/ML
2 VIAL (ML) INJECTION ONCE
Refills: 0 | Status: COMPLETED | OUTPATIENT
Start: 2019-11-29 | End: 2019-11-29

## 2019-11-29 RX ORDER — PANTOPRAZOLE SODIUM 20 MG/1
40 TABLET, DELAYED RELEASE ORAL
Qty: 0 | Refills: 0 | DISCHARGE
Start: 2019-11-29

## 2019-11-29 RX ORDER — FENTANYL CITRATE 50 UG/ML
0.5 INJECTION INTRAVENOUS
Qty: 0 | Refills: 0 | DISCHARGE
Start: 2019-11-29

## 2019-11-29 RX ORDER — ALBUTEROL 90 UG/1
2.5 AEROSOL, METERED ORAL ONCE
Refills: 0 | Status: COMPLETED | OUTPATIENT
Start: 2019-11-29 | End: 2019-11-29

## 2019-11-29 RX ORDER — HEPARIN SODIUM 5000 [USP'U]/ML
5000 INJECTION INTRAVENOUS; SUBCUTANEOUS
Qty: 0 | Refills: 0 | DISCHARGE
Start: 2019-11-29

## 2019-11-29 RX ORDER — NOREPINEPHRINE BITARTRATE/D5W 8 MG/250ML
0.05 PLASTIC BAG, INJECTION (ML) INTRAVENOUS
Qty: 16 | Refills: 0 | Status: DISCONTINUED | OUTPATIENT
Start: 2019-11-29 | End: 2019-11-29

## 2019-11-29 RX ORDER — PANTOPRAZOLE SODIUM 20 MG/1
40 TABLET, DELAYED RELEASE ORAL
Refills: 0 | Status: DISCONTINUED | OUTPATIENT
Start: 2019-11-29 | End: 2019-11-29

## 2019-11-29 RX ORDER — SODIUM BICARBONATE 1 MEQ/ML
150 SYRINGE (ML) INTRAVENOUS ONCE
Refills: 0 | Status: COMPLETED | OUTPATIENT
Start: 2019-11-29 | End: 2019-11-29

## 2019-11-29 RX ORDER — NOREPINEPHRINE BITARTRATE/D5W 8 MG/250ML
0.05 PLASTIC BAG, INJECTION (ML) INTRAVENOUS
Qty: 0 | Refills: 0 | DISCHARGE
Start: 2019-11-29

## 2019-11-29 RX ORDER — FUROSEMIDE 40 MG
20 TABLET ORAL ONCE
Refills: 0 | Status: COMPLETED | OUTPATIENT
Start: 2019-11-29 | End: 2019-11-29

## 2019-11-29 RX ORDER — SODIUM BICARBONATE 1 MEQ/ML
50 SYRINGE (ML) INTRAVENOUS ONCE
Refills: 0 | Status: COMPLETED | OUTPATIENT
Start: 2019-11-29 | End: 2019-11-29

## 2019-11-29 RX ORDER — FENTANYL CITRATE 50 UG/ML
0.5 INJECTION INTRAVENOUS
Qty: 2500 | Refills: 0 | Status: DISCONTINUED | OUTPATIENT
Start: 2019-11-29 | End: 2019-11-29

## 2019-11-29 RX ORDER — MIDAZOLAM HYDROCHLORIDE 1 MG/ML
0.02 INJECTION, SOLUTION INTRAMUSCULAR; INTRAVENOUS
Qty: 100 | Refills: 0 | Status: DISCONTINUED | OUTPATIENT
Start: 2019-11-29 | End: 2019-11-29

## 2019-11-29 RX ORDER — LANOLIN ALCOHOL/MO/W.PET/CERES
1 CREAM (GRAM) TOPICAL
Qty: 0 | Refills: 0 | DISCHARGE
Start: 2019-11-29

## 2019-11-29 RX ORDER — CALCIUM GLUCONATE 100 MG/ML
1 VIAL (ML) INTRAVENOUS ONCE
Refills: 0 | Status: COMPLETED | OUTPATIENT
Start: 2019-11-29 | End: 2019-11-29

## 2019-11-29 RX ORDER — CEFTRIAXONE 500 MG/1
2 INJECTION, POWDER, FOR SOLUTION INTRAMUSCULAR; INTRAVENOUS
Qty: 0 | Refills: 0 | DISCHARGE
Start: 2019-11-29

## 2019-11-29 RX ORDER — FUROSEMIDE 40 MG
40 TABLET ORAL ONCE
Refills: 0 | Status: COMPLETED | OUTPATIENT
Start: 2019-11-29 | End: 2019-11-29

## 2019-11-29 RX ORDER — PANTOPRAZOLE SODIUM 20 MG/1
40 TABLET, DELAYED RELEASE ORAL DAILY
Refills: 0 | Status: DISCONTINUED | OUTPATIENT
Start: 2019-11-29 | End: 2019-11-29

## 2019-11-29 RX ORDER — HYDROCORTISONE 20 MG
100 TABLET ORAL
Qty: 0 | Refills: 0 | DISCHARGE
Start: 2019-11-29

## 2019-11-29 RX ORDER — ALBUTEROL 90 UG/1
1.25 AEROSOL, METERED ORAL
Refills: 0 | Status: DISCONTINUED | OUTPATIENT
Start: 2019-11-29 | End: 2019-11-29

## 2019-11-29 RX ADMIN — Medication 100 MILLIGRAM(S): at 14:35

## 2019-11-29 RX ADMIN — Medication 40 MILLIGRAM(S): at 07:01

## 2019-11-29 RX ADMIN — HEPARIN SODIUM 5000 UNIT(S): 5000 INJECTION INTRAVENOUS; SUBCUTANEOUS at 05:40

## 2019-11-29 RX ADMIN — Medication 20 MILLIGRAM(S): at 03:33

## 2019-11-29 RX ADMIN — PANTOPRAZOLE SODIUM 40 MILLIGRAM(S): 20 TABLET, DELAYED RELEASE ORAL at 18:11

## 2019-11-29 RX ADMIN — Medication 50 MILLIEQUIVALENT(S): at 11:11

## 2019-11-29 RX ADMIN — Medication 2.89 MICROGRAM(S)/KG/MIN: at 14:34

## 2019-11-29 RX ADMIN — Medication 150 MILLIEQUIVALENT(S): at 03:33

## 2019-11-29 RX ADMIN — Medication 110 MILLIGRAM(S): at 18:11

## 2019-11-29 RX ADMIN — Medication 250 MILLIGRAM(S): at 05:40

## 2019-11-29 RX ADMIN — Medication 2 GRAM(S): at 11:11

## 2019-11-29 RX ADMIN — Medication 100 MILLIGRAM(S): at 05:40

## 2019-11-29 RX ADMIN — Medication 110 MILLIGRAM(S): at 05:40

## 2019-11-29 RX ADMIN — HEPARIN SODIUM 5000 UNIT(S): 5000 INJECTION INTRAVENOUS; SUBCUTANEOUS at 14:35

## 2019-11-29 RX ADMIN — CHLORHEXIDINE GLUCONATE 1 APPLICATION(S): 213 SOLUTION TOPICAL at 05:42

## 2019-11-29 RX ADMIN — Medication 200 GRAM(S): at 18:16

## 2019-11-29 NOTE — CONSULT NOTE ADULT - SUBJECTIVE AND OBJECTIVE BOX
Surgeon: /Isac/ Solitario    Consult requesting by: Cindi    HISTORY OF PRESENT ILLNESS: ( Taken from chart / patient in cardiac arrest) Dr. Chau at bedside  62 yo male with PMHx: Htn, Malaria (Recently diagnosed on ATovaquin/Proguanil)  presented to ED c/o SOB, fever, and  weakness. History dates back to  when patient started feeling weak with intermittent fevers, chills and shivering. Pt previously was in [place] in the month of october and had been on Chemoprophylaxis with Mefloquine. Pt had been feeling fine during his trip. Once patient came back, he was feeling weak. As per the wife, patient felt feverish, (fever not documented) which would get better with Tylenol and then pt would have similar symptoms after a few hours. He went to see the family doctor on , blood test done and CXR// done on Saturday and he was put on Atovaquine-Proguanil 4 times a day for 3 days, which he completed the course.   , pt having worsening symptoms with dizziness/lightheadedness and nausea. on arrival to ED, EKG showed sinus rhythm with inferolateral ST elevation and PVCs. Bedside echo showed lateral wall hypokineses and pericardial effusion. Troponin was 10.98. Code STEMI called. Pt underwent emergent LHC. Normal coronaries seen.   Patients condition worsened over next 2 days with rising LFT, LDH and decrease LV function, suggesting myocarditis is possible. Was seen by EP (Dr. Puente) and ID ( Dr.A. Romero) and HF (Dr. Shankar). CTS consulted for possible ECMO in near future if condition continues to decompensate.  Patient currently in cardiac arrest on CTS arrival    NYHA functional class    [ ] Class I (no limitation) [ ] Class II (slight limitation) [ ] Class III (marked limitation) [ X ] Class IV (symptoms at rest)    PAST MEDICAL & SURGICAL HISTORY:  Malaria  Hypertension  No significant past surgical history      MEDICATIONS  (STANDING):  ALBUTerol    0.083%. 2.5 milliGRAM(s) Nebulizer once  aspirin 325 milliGRAM(s) Oral daily  cefTRIAXone   IVPB 2000 milliGRAM(s) IV Intermittent every 24 hours  chlorhexidine 4% Liquid 1 Application(s) Topical <User Schedule>  doxycycline IVPB 100 milliGRAM(s) IV Intermittent every 12 hours  doxycycline IVPB      heparin  Injectable 5000 Unit(s) SubCutaneous every 8 hours  hydrocortisone sodium succinate Injectable 100 milliGRAM(s) IV Push every 8 hours  magnesium sulfate Injectable 2 Gram(s) IV Push once  melatonin 5 milliGRAM(s) Oral at bedtime  midazolam Infusion 0.02 mG/kG/Hr (1.232 mL/Hr) IV Continuous <Continuous>  norepinephrine Infusion 0.05 MICROgram(s)/kG/Min (2.888 mL/Hr) IV Continuous <Continuous>  sodium bicarbonate  Injectable 50 milliEquivalent(s) IV Push once  vancomycin  IVPB 1000 milliGRAM(s) IV Intermittent every 12 hours  vancomycin  IVPB        MEDICATIONS  (PRN):  acetaminophen   Tablet .. 650 milliGRAM(s) Oral every 6 hours PRN Temp greater or equal to 38C (100.4F)  guaiFENesin    Syrup 200 milliGRAM(s) Oral every 6 hours PRN Cough    Allergies  No Known Allergies  Intolerances    SOCIAL HISTORY:  Taken from chart no toacco, alchohol or drug abuse.  Occupation:  Lives with: wife  FAMILY HISTORY:  No pertinent family history in first degree relatives      Review of Systems  from chart: as per hpi                                                                                                                                                                                             PHYSICAL EXAM  Vital Signs Last 24 Hrs  T(C): 37.5 (2019 04:00), Max: 38.6 (2019 12:00)  T(F): 99.5 (2019 04:00), Max: 101.4 (2019 12:00)  HR: 90 (2019 06:00) (76 - 96)  BP: 120/85 (2019 06:00) (93/62 - 120/85)  BP(mean): 96 (2019 06:00) (74 - 96)  RR: 39 (2019 06:00) (30 - 42)  SpO2: 96% (2019 06:00) (88% - 100%)  Right arm bp: Left arm bp;                                                            LABS:  Blood Gas Profile - Arterial (.19 @ 07:50)    pH, Arterial: 6.90    pCO2, Arterial: 68 mmHg    pO2, Arterial: 97 mmHg    HCO3, Arterial: 13 mmoL/L    Base Excess, Arterial: -19.0 mmoL/L    Oxygen Saturation, Arterial: 89 %    FIO2, Arterial: 100    Blood Gas Source Arterial: Arterial    Blood Gas Arterial, Lactate (11.29.19 @ 07:50)    Blood Gas Arterial, Lactate: 18.0 mmoL/L                      8.9    14.76 )-----------( 259      ( 2019 04:32 )             29.3         130<L>  |  89<L>  |  20  ----------------------------<  147<H>  4.8   |  20  |  1.0    Ca    6.9<L>      2019 04:00  Mg     3.2         TPro  6.1  /  Alb  3.4<L>  /  TBili  2.8<H>  /  DBili  x   /  AST  2508<H>  /  ALT  2546<H>  /  AlkPhos  116<H>      Urinalysis Basic - ( 2019 10:20 )    Color: Erin / Appearance: Slightly Turbid / S.033 / pH: x  Gluc: x / Ketone: Negative  / Bili: Negative / Urobili: 3 mg/dL   Blood: x / Protein: 100 mg/dL / Nitrite: Negative   Leuk Esterase: Negative / RBC: 8 /HPF / WBC 1 /HPF   Sq Epi: x / Non Sq Epi: 2 /HPF / Bacteria: Negative    CARDIAC MARKERS ( 2019 04:00 )  x     / 2.81 ng/mL / 986 U/L / x     / 34.4 ng/mL  CARDIAC MARKERS ( 2019 16:53 )  x     / x     / 955 U/L / x     / x      CARDIAC MARKERS ( 2019 04:32 )  x     / 5.95 ng/mL / x     / x     / x          Cardiac Cath: non obstructive diseae    TTE / SETH: < from: 12 Lead ECG (19 @ 06:58) >  Ventricular Rate 104 BPM    Atrial Rate 104 BPM    QRS Duration 182 ms    Q-T Interval 440 ms    QTC Calculation(Bezet) 578 ms    R Axis 137 degrees    T Axis 40 degrees    Diagnosis Line Wide QRS rhythm  Non-specific intra-ventricular conduction block  Possible Right ventricular hypertrophy  Cannot rule out Septal infarct , age undetermined  Lateral infarct , age undetermined  Inferior injury pattern  ** ** ACUTE MI / STEMI ** **  Abnormal ECG    < end of copied text >  < from: Transthoracic Echocardiogram (19 @ 12:52) >  Summary:   1. Left ventricular ejection fraction, by visual estimation, is 35 to   40%.   2. Elevated mean left atrial pressure.   3. Moderate concentric left ventricular hypertrophy.   4. Global stairn - 7.   5. Moderate pericardial effusion.   6. Best seen in subcostal view and anteriorly.   7. Thickening and calcification of the anterior mitral valve leaflet.   8. Mild-moderate tricuspid regurgitation.   9. PSAP at least 55.  10. Mild aortic regurgitation.  11. Sclerotic aortic valve with normal opening.    Impression:  Myocarditis etiology undetermined with respiratory comprise, lactic acidosis currently arresting     Assessment/ Plan:  Attending to revisit patient when patient stabilizes to give recommendation for ECMO.  Will reach out to pablo Duvall also Surgeon: Dr. Chau    Consult requesting by: Cindi    HISTORY OF PRESENT ILLNESS: ( Taken from chart / patient in cardiac arrest) Dr. Chau at bedside  64 yo male with PMHx: Htn, Malaria (Recently diagnosed on ATovaquin/Proguanil)  presented to ED c/o SOB, fever, and  weakness. History dates back to  when patient started feeling weak with intermittent fevers, chills and shivering. Pt previously was in [place] in the month of october and had been on Chemoprophylaxis with Mefloquine. Pt had been feeling fine during his trip. Once patient came back, he was feeling weak. As per the wife, patient felt feverish, (fever not documented) which would get better with Tylenol and then pt would have similar symptoms after a few hours. He went to see the family doctor on , blood test done and CXR// done on Saturday and he was put on Atovaquine-Proguanil 4 times a day for 3 days, which he completed the course.   , pt having worsening symptoms with dizziness/lightheadedness and nausea. on arrival to ED, EKG showed sinus rhythm with inferolateral ST elevation and PVCs. Bedside echo showed lateral wall hypokineses and pericardial effusion. Troponin was 10.98. Code STEMI called. Pt underwent emergent LHC. Normal coronaries seen.   Patients condition worsened over next 2 days with rising LFT, LDH and decrease LV function, suggesting myocarditis is possible. Was seen by EP (Dr. Puente) and ID ( Dr.A. Morales) and HF (Dr. Shankar). CTS consulted for possible ECMO. Patient currently in cardiac arrest on CTS arrival    PAST MEDICAL & SURGICAL HISTORY:  Malaria  Hypertension  No significant past surgical history    MEDICATIONS  (STANDING):  ALBUTerol    0.083%. 2.5 milliGRAM(s) Nebulizer once  aspirin 325 milliGRAM(s) Oral daily  cefTRIAXone   IVPB 2000 milliGRAM(s) IV Intermittent every 24 hours  chlorhexidine 4% Liquid 1 Application(s) Topical <User Schedule>  doxycycline IVPB 100 milliGRAM(s) IV Intermittent every 12 hours  doxycycline IVPB      heparin  Injectable 5000 Unit(s) SubCutaneous every 8 hours  hydrocortisone sodium succinate Injectable 100 milliGRAM(s) IV Push every 8 hours  magnesium sulfate Injectable 2 Gram(s) IV Push once  melatonin 5 milliGRAM(s) Oral at bedtime  midazolam Infusion 0.02 mG/kG/Hr (1.232 mL/Hr) IV Continuous <Continuous>  norepinephrine Infusion 0.05 MICROgram(s)/kG/Min (2.888 mL/Hr) IV Continuous <Continuous>  sodium bicarbonate  Injectable 50 milliEquivalent(s) IV Push once  vancomycin  IVPB 1000 milliGRAM(s) IV Intermittent every 12 hours  vancomycin  IVPB        MEDICATIONS  (PRN):  acetaminophen   Tablet .. 650 milliGRAM(s) Oral every 6 hours PRN Temp greater or equal to 38C (100.4F)  guaiFENesin    Syrup 200 milliGRAM(s) Oral every 6 hours PRN Cough    Allergies  No Known Allergies  Intolerances    SOCIAL HISTORY:  Taken from chart no tobacco, alcohol or drug abuse.  Occupation:  Lives with: wife  FAMILY HISTORY:  No pertinent family history in first degree relatives    Review of Systems  from chart: as per hpi                                                                                                                                                                                             PHYSICAL EXAM  Vital Signs Last 24 Hrs  T(C): 37.5 (2019 04:00), Max: 38.6 (2019 12:00)  T(F): 99.5 (2019 04:00), Max: 101.4 (2019 12:00)  HR: 90 (2019 06:00) (76 - 96)  BP: 120/85 (2019 06:00) (93/62 - 120/85)  BP(mean): 96 (:00) (74 - 96)  RR: 39 (:00) (30 - 42)  SpO2: 96% (:00) (88% - 100%)  Right arm bp: Left arm bp;                                                          LABS:  Blood Gas Profile - Arterial (19 @ 07:50)    pH, Arterial: 6.90    pCO2, Arterial: 68 mmHg    pO2, Arterial: 97 mmHg    HCO3, Arterial: 13 mmoL/L    Base Excess, Arterial: -19.0 mmoL/L    Oxygen Saturation, Arterial: 89 %    FIO2, Arterial: 100    Blood Gas Source Arterial: Arterial    Blood Gas Arterial, Lactate (19 @ 07:50)    Blood Gas Arterial, Lactate: 18.0 mmoL/L                      8.9    14.76 )-----------( 259      ( 2019 04:32 )             29.3     11-29    130<L>  |  89<L>  |  20  ----------------------------<  147<H>  4.8   |  20  |  1.0    Ca    6.9<L>      2019 04:00  Mg     3.2         TPro  6.1  /  Alb  3.4<L>  /  TBili  2.8<H>  /  DBili  x   /  AST  2508<H>  /  ALT  2546<H>  /  AlkPhos  116<H>      Urinalysis Basic - ( 2019 10:20 )    Color: Erin / Appearance: Slightly Turbid / S.033 / pH: x  Gluc: x / Ketone: Negative  / Bili: Negative / Urobili: 3 mg/dL   Blood: x / Protein: 100 mg/dL / Nitrite: Negative   Leuk Esterase: Negative / RBC: 8 /HPF / WBC 1 /HPF   Sq Epi: x / Non Sq Epi: 2 /HPF / Bacteria: Negative    CARDIAC MARKERS ( 2019 04:00 )  x     / 2.81 ng/mL / 986 U/L / x     / 34.4 ng/mL  CARDIAC MARKERS ( 2019 16:53 )  x     / x     / 955 U/L / x     / x      CARDIAC MARKERS ( 2019 04:32 )  x     / 5.95 ng/mL / x     / x     / x        Cardiac Cath: non obstructive disease    TTE / SETH: < from: 12 Lead ECG (19 @ 06:58) >  Ventricular Rate 104 BPM    Atrial Rate 104 BPM    QRS Duration 182 ms    Q-T Interval 440 ms    QTC Calculation(Bezet) 578 ms    R Axis 137 degrees    T Axis 40 degrees    Diagnosis Line Wide QRS rhythm  Non-specific intra-ventricular conduction block  Possible Right ventricular hypertrophy  Cannot rule out Septal infarct , age undetermined  Lateral infarct , age undetermined  Inferior injury pattern  ** ** ACUTE MI / STEMI ** **  Abnormal ECG    < from: Transthoracic Echocardiogram (19 @ 12:52) >  Summary:   1. Left ventricular ejection fraction, by visual estimation, is 35 to   40%.   2. Elevated mean left atrial pressure.   3. Moderate concentric left ventricular hypertrophy.   5. Moderate pericardial effusion.   6. Best seen in subcostal view and anteriorly.   7. Thickening and calcification of the anterior mitral valve leaflet.   8. Mild-moderate tricuspid regurgitation.   9. PSAP at least 55.  10. Mild aortic regurgitation.  11. Sclerotic aortic valve with normal opening.    Assessment/ Plan:  Attending to revisit patient when patient stabilizes to give recommendation for ECMO.  Will reach out to Raad Duvall also

## 2019-11-29 NOTE — PROGRESS NOTE ADULT - PROBLEM SELECTOR PLAN 2
S/p cardiac arrest on mechanical ventilation   He is hemodynamically stable, no on pressor support   CXR shows pulmonary edema   PA catheter showed PA pressures 40s/20 with PCWP 20, RA 12 and SVR ~ 1100   Monitor oxygenation   Repeat CXR   Get an TTE   Continue sedation and vent management per ICU team   If no improvement, will consider ECMO support

## 2019-11-29 NOTE — DISCHARGE NOTE PROVIDER - HOSPITAL COURSE
64 yo male    PMHx: Htn, Malaria (Recently diagnosed on ATovaquin/Proguanil)    CC: SOB, fever, weakness    History dates back to 11/18 Monday when patient started feeling weak with intermittent fevers, chills and shivering. Pt previously was in [place] in the month of october and had been on Chemoprophylaxis with Mefloquine. Pt had been feeling fine during his trip. Once patient came back, he was feeling weak. As per the wife, patient felt feverish, (fever not documented) which would get better with Tylenol and then pt would have similar symptoms after a few hours. Pt went to see the family doctor on Friday had blood test done and the CXR done on Saturday and he was put on Atovaquine-Proguanil 4 times a day for 3 days, which he completed the course.     11/26, pt was having worsening symptoms with dizziness/lightheadedness and nausea this AM but no vomiting. Pt denied chest pain, dyspnea, palpitations, cough, urinary symptoms, headaches, hematuria, hematochezia, trauma, LOC.        ER Course: Vitals on admission were /55, HR 39, T 97F, SpO2 100% on RA. EKG showed sinus rhythm with inferolateral ST elevation and PVCs. Bedside echo showed lateral wall hypokineses and pericardial effusion. Troponin was 10.98. Code STEMI called. Pt underwent emergent LHC. Normal coronaries seen.         Pt started on Ceftriaxone 2g q24. Blood cultures sent. Full infectious work up sent.         Overnight 11/29, pt became tachypneic, hypoxic. XR chest showed worsening congestion. Metabolic acidosis noted, lactate of 11. He received bicarb push per renal. In the morning, pt was having trouble breathing. Pt became unresponsive and code blue was called. Pt intubated and resuscitated adequately, Levophed initiated. Pt evaluated by GI, CT surg, Nephrology, Infectious Disease, and EP. No ecmo at the moment. Pt sedated with fentanyl.        Pending infectious workup:    - F/u GI PCR, Hep B+C PCR, HIV PCR, tick PCR, plasmodium PCR     - F/u [place] mountain spotted fever IgG, Quantiferon plus TB, Q fever antibody, Hep E IgG/IgE, trypanosoma cruzzi Ab, mycoplasma IgG/IgM, haptoglobin, coxsackie A/B Ab    - U/S abdomen     - Peripheral smear for inclusion bodies        Transfer to [facility]

## 2019-11-29 NOTE — CONSULT NOTE ADULT - ASSESSMENT
64 y/o M with acidosis presented to the hospital for fever, weakness, chills, shivering, SOB. recently returned from [place], was on chemoprophylaxis with Mefloquine. Started on Atovaquine-Proguanil 4 times a day x 3 days (completed). elevated trops, s/p cath with normal coronaries.  PMH HTN, h/o Malaria    # Acidosis / ?APURVA   - s/o cardiac arrest today   - repeat labs noted for elevated AG, low bicarb.   - ABG noted for high AG metabolic acidosis plus respiratory acidosis plus metabolic alkalosis on delta/delta   - s/p bicarb 150 meq IV push.   - serum lactate and pH improved on repeat ABG.   - s/p galeano today, 75 cc urine collected in bag   - s/p IV lasix   - serum creatinine ~ 1.0, though if drop in urine outpt noted, pt might have developed APURVA.   - hyponatremia noted.   - potassium WNL.   - CK level elevated, can be due to CPR. not significantly elevated for rhabdo   - hypocalcemia noted. elevated Ph noted.   - elevated Mg noted, s/p Mg rider   - elevated LFTs noted, ? infection vs shock. seen by GI, recommendations pending.   - ID on case. myopericarditis/myocarditis vs infectious   - Trops noted. cardio on case. CTS called for ECMO.   - 62 y/o M with acidosis presented to the hospital for fever, weakness, chills, shivering, SOB. recently returned from [place], was on chemoprophylaxis with Mefloquine. Started on Atovaquine-Proguanil 4 times a day x 3 days (completed). elevated trops, s/p cath with normal coronaries.  PMH HTN, h/o Malaria    # Acidosis / ?APURVA   - s/o cardiac arrest today   - repeat labs noted for elevated AG, low bicarb.   - ABG noted for high AG metabolic acidosis plus respiratory acidosis plus metabolic alkalosis on delta/delta   - s/p bicarb 150 meq IV push.   - serum lactate and pH improved on repeat ABG.   - s/p galeano today, 75 cc urine collected in bag   - s/p IV lasix   - serum creatinine ~ 1.0, though if drop in urine outpt noted, pt might have developed APURVA.   - hyponatremia noted.   - potassium WNL.   - CK level elevated, can be due to CPR. not significantly elevated for rhabdo   - hypocalcemia noted. elevated Ph noted.   - elevated Mg noted, s/p Mg rider   - elevated LFTs noted, ? infection vs shock. seen by GI, recommendations pending.   - ID on case. myopericarditis/myocarditis vs infectious   - Trops noted. cardio on case. CTS called for ECMO.      # Plan   - repeat UA, check urine Pr/Cr ratio   - continue bicarb pushes   - give IV lasix 80 mg   - repeat ABG   - strict I/O   - repeat BMP, trend creatinine   - avoid nephrotoxins and hypotension   - no acute indication for RRT at the moment. but needs close monitoring   - eelvated WBC count with fever on vitals: on ATB, check vanco trough.   - will follow

## 2019-11-29 NOTE — CONSULT NOTE ADULT - SUBJECTIVE AND OBJECTIVE BOX
Gastroenterology Consultation:    Patient is a 63y old  Male who presents with a chief complaint of SOB, fever, weakness (28 Nov 2019 17:09)      Admitted on: 11-26-19  HPI:  64 yo male  PMHx: Htn, Malaria (Recently diagnosed on ATovaquin/Proguanil)  CC: SOB, fever, weakness  History dates back to 11/18 Monday when patient started feeling weak with intermittent fevers, chills and shivering. Pt previously was in [place] in the month of october and had been on Chemoprophylaxis with Mefloquine. Pt had been feeling fine during his trip.   Once patient came back, he was feeling weak. As per the wife, patient felt feverish, (fever not documented) which would get better with Tylenol and then pt would have similar symptoms after a few hours.   Pt went to see the family doctor on Friday had blood test done and the CXR done on Saturday and he was put on Atovaquine-Proguanil 4 times a day for 3 days, which he completed the course.   Today, pt was having worsening symptoms with dizziness/lightheadedness and nausea this AM but no vomiting.   Pt denied chest pain, dyspnea, palpitations, cough, urinary symptoms, headaches, hematuria, hematochezia, trauma, LOC.  ER Course: Vitals on admission were /55, HR 39, T 97F, SpO2 100% on RA. EKG showed sinus rhythm with inferolateral ST elevation and PVCs. Bedside echo showed lateral wall hypokineses and pericardial effusion. Troponin was 10.98. Code STEMI called. Pt underwent emergent LHC. Normal coronaries seen.         Prior records Reviewed (Y/N):  History obtained from person other than patient (Y/N):        PAST MEDICAL & SURGICAL HISTORY:  Malaria  Hypertension  No significant past surgical history      FAMILY HISTORY:  No pertinent family history in first degree relatives      Social History:  Tobacco:  Alcohol:  Drugs:    Home Medications:  arginine 500 mg oral capsule: 1 cap(s) orally 2 times a day (27 Nov 2019 08:08)  atovaquone-proguanil: orally 4 times a day (27 Nov 2019 08:08)    MEDICATIONS  (STANDING):  ALBUTerol    0.083%. 2.5 milliGRAM(s) Nebulizer once  aspirin 325 milliGRAM(s) Oral daily  cefTRIAXone   IVPB 2000 milliGRAM(s) IV Intermittent every 24 hours  chlorhexidine 4% Liquid 1 Application(s) Topical <User Schedule>  doxycycline IVPB 100 milliGRAM(s) IV Intermittent every 12 hours  doxycycline IVPB      heparin  Injectable 5000 Unit(s) SubCutaneous every 8 hours  hydrocortisone sodium succinate Injectable 100 milliGRAM(s) IV Push every 8 hours  melatonin 5 milliGRAM(s) Oral at bedtime  vancomycin  IVPB 1000 milliGRAM(s) IV Intermittent every 12 hours  vancomycin  IVPB        MEDICATIONS  (PRN):  acetaminophen   Tablet .. 650 milliGRAM(s) Oral every 6 hours PRN Temp greater or equal to 38C (100.4F)  guaiFENesin    Syrup 200 milliGRAM(s) Oral every 6 hours PRN Cough      Allergies  No Known Allergies      Review of Systems:   Constitutional:  No Fever, No Chills  ENT/Mouth:  No Hearing Changes,  No Difficulty Swallowing  Eyes:  No Eye Pain, No Vision Changes  Cardiovascular:  No Chest Pain, No Palpitations  Respiratory:  No Cough, No Dyspnea  Gastrointestinal:  As described in HPI  Musculoskeletal:  No Joint Swelling, No Back Pain  Skin:  No Skin Lesions, No Jaundice  Neuro:  No Syncope, No Dizziness  Heme/Lymph:  No Bruising, No Bleeding.          Physical Examination:  T(C): 37.5 (11-29-19 @ 04:00), Max: 38.6 (11-28-19 @ 12:00)  HR: 90 (11-29-19 @ 06:00) (76 - 96)  BP: 120/85 (11-29-19 @ 06:00) (93/62 - 120/85)  RR: 39 (11-29-19 @ 06:00) (30 - 42)  SpO2: 96% (11-29-19 @ 06:00) (88% - 100%)      11-27-19 @ 07:01  -  11-28-19 @ 07:00  --------------------------------------------------------  IN: 2100 mL / OUT: 150 mL / NET: 1950 mL    11-28-19 @ 07:01  -  11-29-19 @ 07:00  --------------------------------------------------------  IN: 2090 mL / OUT: 875 mL / NET: 1215 mL        Constitutional: No acute distress.  Eyes:. Conjunctivae are clear, Sclera is non-icteric.  Ears Nose and Throat: The external ears are normal appearing,  Oral mucosa is pink and moist.  Respiratory:  No signs of respiratory distress. Lung sounds are clear bilaterally.  Cardiovascular:  S1 S2, Regular rate and rhythm.  GI: Abdomen is soft, symmetric, and non-tender without distention. There are no visible lesions or scars. Bowel sounds are present and normoactive in all four quadrants. No masses, hepatomegaly, or splenomegaly are noted.   Neuro: No Tremor, No involuntary movements  Skin: No rashes, No Jaundice.          Data: (reviewed by attending)                        8.9    14.76 )-----------( 259      ( 28 Nov 2019 04:32 )             29.3     Hgb Trend:  8.9  11-28-19 @ 04:32  10.2  11-27-19 @ 04:30  9.7  11-26-19 @ 09:20        11-29    130<L>  |  89<L>  |  20  ----------------------------<  147<H>  4.8   |  20  |  1.0    Ca    6.9<L>      29 Nov 2019 04:00  Mg     3.2     11-29    TPro  6.1  /  Alb  3.4<L>  /  TBili  2.8<H>  /  DBili  x   /  AST  2508<H>  /  ALT  2546<H>  /  AlkPhos  116<H>  11-29    Liver panel trend:  TBili 2.8   /   AST 2508   /   ALT 2546   /   AlkP 116   /   Tptn 6.1   /   Alb 3.4    /   DBili --      11-29  TBili 1.7   /   AST 1576   /   ALT 1621   /   AlkP 118   /   Tptn 6.9   /   Alb 3.6    /   DBili --      11-28  TBili 1.0   /      /      /   AlkP 115   /   Tptn 7.2   /   Alb 3.8    /   DBili --      11-27  TBili 1.1   /      /      /   AlkP 105   /   Tptn 7.2   /   Alb 4.1    /   DBili --      11-26          Culture - Blood (collected 27 Nov 2019 04:30)  Source: .Blood None  Preliminary Report (28 Nov 2019 16:01):    No growth to date.    Culture - Blood (collected 27 Nov 2019 00:25)  Source: .Blood Blood  Preliminary Report (28 Nov 2019 07:01):    No growth to date.    Culture - Blood (collected 27 Nov 2019 00:25)  Source: .Blood Blood  Preliminary Report (28 Nov 2019 07:01):    No growth to date.    Culture - Blood (collected 26 Nov 2019 13:00)  Source: .Blood Blood-Peripheral  Preliminary Report (28 Nov 2019 02:02):    No growth to date.          Radiology:(reviewed by attending) Gastroenterology Consultation:    Patient is a 63y old  Male who presents with a chief complaint of SOB, fever, weakness (28 Nov 2019 17:09)      Admitted on: 11-26-19  HPI:    64 yo male with PMh of HTN presents with SOB and chills and now found to have elevated LFTs. History dates back to 11/18 Monday when patient started feeling weak with intermittent fevers, chills and shivering. Pt previously was in [place] in the month of october and had been on Chemoprophylaxis with Mefloquine. Pt had been feeling fine during his trip. Once patient came back, he was feeling weak. As per the wife, patient felt feverish, (fever not documented) which would get better with Tylenol and then pt would have similar symptoms after a few hours.   Pt went to see the family doctor on Friday had blood test done and the CXR done on Saturday and he was put on Atovaquine-Proguanil 4 times a day for 3 days, but no malaria testing was performed. he was feeling more weak so he came to the ED. Vitals on admission were /55, HR 39, T 97F, SpO2 100% on RA. EKG showed sinus rhythm with inferolateral ST elevation and PVCs. Bedside echo showed lateral wall hypokineses and pericardial effusion. Troponin was 10.98. Code STEMI called. Pt underwent emergent LHC. Normal coronaries seen. patient today had a code blue and successfully resuscitated. Now he is intubated and on pressors.      Prior records Reviewed (Y/N): Y  History obtained from person other than patient (Y/N): Primary team.      PAST MEDICAL & SURGICAL HISTORY:  Hypertension  No significant past surgical history      FAMILY HISTORY:  No pertinent family history in first degree relatives      Social History:  Tobacco: Non smoker      Home Medications:  arginine 500 mg oral capsule: 1 cap(s) orally 2 times a day (27 Nov 2019 08:08)  atovaquone-proguanil: orally 4 times a day (27 Nov 2019 08:08)    MEDICATIONS  (STANDING):  ALBUTerol    0.083%. 2.5 milliGRAM(s) Nebulizer once  aspirin 325 milliGRAM(s) Oral daily  cefTRIAXone   IVPB 2000 milliGRAM(s) IV Intermittent every 24 hours  chlorhexidine 4% Liquid 1 Application(s) Topical <User Schedule>  doxycycline IVPB 100 milliGRAM(s) IV Intermittent every 12 hours  doxycycline IVPB      heparin  Injectable 5000 Unit(s) SubCutaneous every 8 hours  hydrocortisone sodium succinate Injectable 100 milliGRAM(s) IV Push every 8 hours  melatonin 5 milliGRAM(s) Oral at bedtime  vancomycin  IVPB 1000 milliGRAM(s) IV Intermittent every 12 hours  vancomycin  IVPB        MEDICATIONS  (PRN):  acetaminophen   Tablet .. 650 milliGRAM(s) Oral every 6 hours PRN Temp greater or equal to 38C (100.4F)  guaiFENesin    Syrup 200 milliGRAM(s) Oral every 6 hours PRN Cough      Allergies  No Known Allergies      Review of Systems: Unobtainable      Physical Examination:  T(C): 37.5 (11-29-19 @ 04:00), Max: 38.6 (11-28-19 @ 12:00)  HR: 90 (11-29-19 @ 06:00) (76 - 96)  BP: 120/85 (11-29-19 @ 06:00) (93/62 - 120/85)  RR: 39 (11-29-19 @ 06:00) (30 - 42)  SpO2: 96% (11-29-19 @ 06:00) (88% - 100%)      11-27-19 @ 07:01  -  11-28-19 @ 07:00  --------------------------------------------------------  IN: 2100 mL / OUT: 150 mL / NET: 1950 mL    11-28-19 @ 07:01  -  11-29-19 @ 07:00  --------------------------------------------------------  IN: 2090 mL / OUT: 875 mL / NET: 1215 mL        Constitutional: lying in bed.  Eyes:. Sclera is icteric  Ears Nose and Throat: The external ears are normal appearing,  Oral mucosa is pink and moist.  Respiratory:  intubated and vented.  Cardiovascular:  S1 S2, Regular rate and rhythm.  GI: Abdomen is soft, symmetric, and non-tender without distention. There are no visible lesions or scars. Bowel sounds are present and normoactive in all four quadrants. No masses, hepatomegaly, or splenomegaly are noted.   Neuro: No Tremor, No involuntary movements  Skin: Jaundice.          Data: (reviewed by attending)                        8.9    14.76 )-----------( 259      ( 28 Nov 2019 04:32 )             29.3     Hgb Trend:  8.9  11-28-19 @ 04:32  10.2  11-27-19 @ 04:30  9.7  11-26-19 @ 09:20        11-29    130<L>  |  89<L>  |  20  ----------------------------<  147<H>  4.8   |  20  |  1.0    Ca    6.9<L>      29 Nov 2019 04:00  Mg     3.2     11-29    TPro  6.1  /  Alb  3.4<L>  /  TBili  2.8<H>  /  DBili  x   /  AST  2508<H>  /  ALT  2546<H>  /  AlkPhos  116<H>  11-29    Liver panel trend:  TBili 2.8   /   AST 2508   /   ALT 2546   /   AlkP 116   /   Tptn 6.1   /   Alb 3.4    /   DBili --      11-29  TBili 1.7   /   AST 1576   /   ALT 1621   /   AlkP 118   /   Tptn 6.9   /   Alb 3.6    /   DBili --      11-28  TBili 1.0   /      /      /   AlkP 115   /   Tptn 7.2   /   Alb 3.8    /   DBili --      11-27  TBili 1.1   /      /      /   AlkP 105   /   Tptn 7.2   /   Alb 4.1    /   DBili --      11-26          Culture - Blood (collected 27 Nov 2019 04:30)  Source: .Blood None  Preliminary Report (28 Nov 2019 16:01):    No growth to date.    Culture - Blood (collected 27 Nov 2019 00:25)  Source: .Blood Blood  Preliminary Report (28 Nov 2019 07:01):    No growth to date.    Culture - Blood (collected 27 Nov 2019 00:25)  Source: .Blood Blood  Preliminary Report (28 Nov 2019 07:01):    No growth to date.    Culture - Blood (collected 26 Nov 2019 13:00)  Source: .Blood Blood-Peripheral  Preliminary Report (28 Nov 2019 02:02):    No growth to date.          Radiology:(reviewed by attending) Gastroenterology Consultation:    Patient is a 63y old  Male who presents with a chief complaint of SOB, fever, weakness (28 Nov 2019 17:09)      Admitted on: 11-26-19  HPI:    62 yo male with PMh of HTN presents with SOB and chills and now found to have elevated LFTs. History dates back to 11/18 Monday when patient started feeling weak with intermittent fevers, chills and shivering. Pt previously was in [place] in the month of october and had been on Chemoprophylaxis for malaria with Mefloquine. Pt had been feeling fine during his trip. Once patient came back, he was feeling weak. As per the wife, patient felt feverish, (fever not documented) which would get better with Tylenol and then pt would have similar symptoms after a few hours.   Pt went to see the family doctor on Friday had blood test done and the CXR done on Saturday and he was put on Atovaquine-Proguanil 4 times a day for 3 days, but no malaria testing was performed. He was feeling more weak so he came to the ED. Vitals on admission were /55, HR 39, T 97F, SpO2 100% on RA. EKG showed sinus rhythm with inferolateral ST elevation and PVCs. Bedside echo showed lateral wall hypokineses and pericardial effusion. Troponin was 10.98. Code STEMI called. Pt underwent emergent LHC. Normal coronaries seen. patient today had a code blue and successfully resuscitated. Now he is intubated and on pressors.      Prior records Reviewed (Y/N): Y  History obtained from person other than patient (Y/N): Primary team, family.      PAST MEDICAL & SURGICAL HISTORY:  Hypertension  No significant past surgical history      FAMILY HISTORY:  No pertinent family history in first degree relatives      Social History:  Tobacco: Non smoker      Home Medications:  arginine 500 mg oral capsule: 1 cap(s) orally 2 times a day (27 Nov 2019 08:08)  atovaquone-proguanil: orally 4 times a day (27 Nov 2019 08:08)    MEDICATIONS  (STANDING):  ALBUTerol    0.083%. 2.5 milliGRAM(s) Nebulizer once  aspirin 325 milliGRAM(s) Oral daily  cefTRIAXone   IVPB 2000 milliGRAM(s) IV Intermittent every 24 hours  chlorhexidine 4% Liquid 1 Application(s) Topical <User Schedule>  doxycycline IVPB 100 milliGRAM(s) IV Intermittent every 12 hours  doxycycline IVPB      heparin  Injectable 5000 Unit(s) SubCutaneous every 8 hours  hydrocortisone sodium succinate Injectable 100 milliGRAM(s) IV Push every 8 hours  melatonin 5 milliGRAM(s) Oral at bedtime  vancomycin  IVPB 1000 milliGRAM(s) IV Intermittent every 12 hours  vancomycin  IVPB        MEDICATIONS  (PRN):  acetaminophen   Tablet .. 650 milliGRAM(s) Oral every 6 hours PRN Temp greater or equal to 38C (100.4F)  guaiFENesin    Syrup 200 milliGRAM(s) Oral every 6 hours PRN Cough      Allergies  No Known Allergies      Review of Systems: Unobtainable      Physical Examination:  T(C): 37.5 (11-29-19 @ 04:00), Max: 38.6 (11-28-19 @ 12:00)  HR: 90 (11-29-19 @ 06:00) (76 - 96)  BP: 120/85 (11-29-19 @ 06:00) (93/62 - 120/85)  RR: 39 (11-29-19 @ 06:00) (30 - 42)  SpO2: 96% (11-29-19 @ 06:00) (88% - 100%)      11-27-19 @ 07:01  -  11-28-19 @ 07:00  --------------------------------------------------------  IN: 2100 mL / OUT: 150 mL / NET: 1950 mL    11-28-19 @ 07:01  -  11-29-19 @ 07:00  --------------------------------------------------------  IN: 2090 mL / OUT: 875 mL / NET: 1215 mL        Constitutional: lying in bed.  Eyes:. Sclera is icteric  Ears Nose and Throat: The external ears are normal appearing,  Oral mucosa is pink and moist.  Respiratory:  intubated and vented.  Cardiovascular:  S1 S2, Regular rate and rhythm.  GI: Abdomen is soft, symmetric, and non-tender without distention. There are no visible lesions or scars. Bowel sounds are present and normoactive in all four quadrants. No masses, hepatomegaly, or splenomegaly are noted.   Neuro: No Tremor, No involuntary movements  Skin: Jaundice.          Data: (reviewed by attending)                        8.9    14.76 )-----------( 259      ( 28 Nov 2019 04:32 )             29.3     Hgb Trend:  8.9  11-28-19 @ 04:32  10.2  11-27-19 @ 04:30  9.7  11-26-19 @ 09:20        11-29    130<L>  |  89<L>  |  20  ----------------------------<  147<H>  4.8   |  20  |  1.0    Ca    6.9<L>      29 Nov 2019 04:00  Mg     3.2     11-29    TPro  6.1  /  Alb  3.4<L>  /  TBili  2.8<H>  /  DBili  x   /  AST  2508<H>  /  ALT  2546<H>  /  AlkPhos  116<H>  11-29    Liver panel trend:  TBili 2.8   /   AST 2508   /   ALT 2546   /   AlkP 116   /   Tptn 6.1   /   Alb 3.4    /   DBili --      11-29  TBili 1.7   /   AST 1576   /   ALT 1621   /   AlkP 118   /   Tptn 6.9   /   Alb 3.6    /   DBili --      11-28  TBili 1.0   /      /      /   AlkP 115   /   Tptn 7.2   /   Alb 3.8    /   DBili --      11-27  TBili 1.1   /      /      /   AlkP 105   /   Tptn 7.2   /   Alb 4.1    /   DBili --      11-26          Culture - Blood (collected 27 Nov 2019 04:30)  Source: .Blood None  Preliminary Report (28 Nov 2019 16:01):    No growth to date.    Culture - Blood (collected 27 Nov 2019 00:25)  Source: .Blood Blood  Preliminary Report (28 Nov 2019 07:01):    No growth to date.    Culture - Blood (collected 27 Nov 2019 00:25)  Source: .Blood Blood  Preliminary Report (28 Nov 2019 07:01):    No growth to date.    Culture - Blood (collected 26 Nov 2019 13:00)  Source: .Blood Blood-Peripheral  Preliminary Report (28 Nov 2019 02:02):    No growth to date.          Radiology:(reviewed by attending)

## 2019-11-29 NOTE — PROCEDURE NOTE - ADDITIONAL PROCEDURE DETAILS
[place] pam was inserted and placed in PA:  CVP :11  PA: 45/20/33  PCWP: 20  CO chantal method : 4

## 2019-11-29 NOTE — CONSULT NOTE ADULT - SUBJECTIVE AND OBJECTIVE BOX
Patient is a 63y old  Male who presents with a chief complaint of SOB, fever, weakness (2019 08:28)      HPI:  62 yo male  PMHx: Htn, Malaria (Recently diagnosed on ATovaquin/Proguanil)  CC: SOB, fever, weakness  History dates back to  when patient started feeling weak with intermittent fevers, chills and shivering. Pt previously was in [place] in the month of october and had been on Chemoprophylaxis with Mefloquine. Pt had been feeling fine during his trip.   Once patient came back, he was feeling weak. As per the wife, patient felt feverish, (fever not documented) which would get better with Tylenol and then pt would have similar symptoms after a few hours.   Pt went to see the family doctor on Friday had blood test done and the CXR done on Saturday and he was put on Atovaquine-Proguanil 4 times a day for 3 days, which he completed the course.   Today, pt was having worsening symptoms with dizziness/lightheadedness and nausea this AM but no vomiting.   Pt denied chest pain, dyspnea, palpitations, cough, urinary symptoms, headaches, hematuria, hematochezia, trauma, LOC.  ER Course: Vitals on admission were /55, HR 39, T 97F, SpO2 100% on RA. EKG showed sinus rhythm with inferolateral ST elevation and PVCs. Bedside echo showed lateral wall hypokineses and pericardial effusion. Troponin was 10.98. Code STEMI called. Pt underwent emergent LHC. Normal coronaries seen. (2019 09:52)   this morning patient went into cardiac arrest cpr done by cardiology team   swan pam placed mild elevation PA pressure and wedges pressur     PAST MEDICAL & SURGICAL HISTORY:  Malaria  Hypertension  No significant past surgical history    Allergies    No Known Allergies    Intolerances      Family history : no cardiovscular family history   Home Medications:  arginine 500 mg oral capsule: 1 cap(s) orally 2 times a day (2019 08:08)  atovaquone-proguanil: orally 4 times a day (2019 08:08)    Occupation:  Alochol: Denied  Smoking: Denied  Drug Use: Denied  Marital Status:         ROS: as in HPI; All other systems reviewed are negative    ICU Vital Signs Last 24 Hrs  T(C): 37.5 (2019 04:00), Max: 38.6 (2019 12:00)  T(F): 99.5 (2019 04:00), Max: 101.4 (2019 12:00)  HR: 96 (2019 07:30) (76 - 96)  BP: 120/85 (2019 06:00) (93/62 - 120/85)  BP(mean): 96 (2019 06:00) (74 - 96)  ABP: --  ABP(mean): --  RR: 39 (2019 06:00) (30 - 42)  SpO2: 100% (2019 07:30) (88% - 100%)        Physical Examination:    General: on sedation     HEENT: Pupils equal, reactive to light.  Symmetric.    PULM: b/l rhonchi     CVS: Regular rate and rhythm, no murmurs, rubs, or gallops    ABD: Soft, nondistended, nontender, normoactive bowel sounds, no masses    EXT: No edema, nontender, no clubbing     SKIN: Warm and well perfused, no rashes noted.    Neurology : no motor or sensory deficit     Musculoskeletal : move all extremity     Lymphatic system: no Palpable node       Mode: AC/ CMV (Assist Control/ Continuous Mandatory Ventilation)  RR (machine): 28  TV (machine): 450  FiO2: 100  PEEP: 5  ITime: 1  MAP: 16  PIP: 29      ABG - ( 2019 08:50 )  pH, Arterial: 7.20  pH, Blood: x     /  pCO2: 40    /  pO2: 94    / HCO3: 16    / Base Excess: -11.5 /  SaO2: 94                  I&O's Detail    2019 07:01  -  2019 07:00  --------------------------------------------------------  IN:    IV PiggyBack: 400 mL    Oral Fluid: 890 mL    sodium chloride 0.9%: 800 mL  Total IN: 2090 mL    OUT:    Voided: 875 mL  Total OUT: 875 mL    Total NET: 1215 mL            LABS:                        8.9    14.76 )-----------( 259      ( 2019 04:32 )             29.3     2019 04:00    130    |  89     |  20     ----------------------------<  147    4.8     |  20     |  1.0      Ca    6.9        2019 04:00  Mg     3.2       2019 04:00    TPro  6.1    /  Alb  3.4    /  TBili  2.8    /  DBili  x      /  AST  2508   /  ALT  2546   /  AlkPhos  116    2019 04:00  Amylase x     lipase x          CARDIAC MARKERS ( 2019 04:00 )  x     / 2.81 ng/mL / 986 U/L / x     / 34.4 ng/mL  CARDIAC MARKERS ( 2019 16:53 )  x     / x     / 955 U/L / x     / x      CARDIAC MARKERS ( 2019 04:32 )  x     / 5.95 ng/mL / x     / x     / x          CAPILLARY BLOOD GLUCOSE          Urinalysis Basic - ( 2019 10:20 )    Color: Erin / Appearance: Slightly Turbid / S.033 / pH: x  Gluc: x / Ketone: Negative  / Bili: Negative / Urobili: 3 mg/dL   Blood: x / Protein: 100 mg/dL / Nitrite: Negative   Leuk Esterase: Negative / RBC: 8 /HPF / WBC 1 /HPF   Sq Epi: x / Non Sq Epi: 2 /HPF / Bacteria: Negative      Culture    Culture - Blood (collected 2019 04:30)  Source: .Blood None  Preliminary Report (2019 16:01):    No growth to date.    Culture - Blood (collected 2019 00:25)  Source: .Blood Blood  Preliminary Report (2019 07:01):    No growth to date.    Culture - Blood (collected 2019 00:25)  Source: .Blood Blood  Preliminary Report (2019 07:01):    No growth to date.    Culture - Blood (collected 2019 13:00)  Source: .Blood Blood-Peripheral  Preliminary Report (2019 02:02):    No growth to date.      Lactate, Blood: 3.8 mmol/L (19 @ 16:53)  Lactate, Blood: 4.7 mmol/L (19 @ 04:32)  Lactate, Blood: 3.3 mmol/L (19 @ 04:30)  Lactate, Blood: 2.4 mmol/L (19 @ 13:00)      MEDICATIONS  (STANDING):  ALBUTerol    0.083%. 2.5 milliGRAM(s) Nebulizer once  aspirin 325 milliGRAM(s) Oral daily  cefTRIAXone   IVPB 2000 milliGRAM(s) IV Intermittent every 24 hours  chlorhexidine 4% Liquid 1 Application(s) Topical <User Schedule>  doxycycline IVPB 100 milliGRAM(s) IV Intermittent every 12 hours  doxycycline IVPB      heparin  Injectable 5000 Unit(s) SubCutaneous every 8 hours  hydrocortisone sodium succinate Injectable 100 milliGRAM(s) IV Push every 8 hours  magnesium sulfate Injectable 2 Gram(s) IV Push once  melatonin 5 milliGRAM(s) Oral at bedtime  midazolam Infusion 0.02 mG/kG/Hr (1.232 mL/Hr) IV Continuous <Continuous>  norepinephrine Infusion 0.05 MICROgram(s)/kG/Min (2.888 mL/Hr) IV Continuous <Continuous>  sodium bicarbonate  Injectable 50 milliEquivalent(s) IV Push once  vancomycin  IVPB 1000 milliGRAM(s) IV Intermittent every 12 hours  vancomycin  IVPB        MEDICATIONS  (PRN):  acetaminophen   Tablet .. 650 milliGRAM(s) Oral every 6 hours PRN Temp greater or equal to 38C (100.4F)  guaiFENesin    Syrup 200 milliGRAM(s) Oral every 6 hours PRN Cough        RADIOLOGY: ***     CXR: b/l opacity   TLC:  OG:  ET tube:        CAM ICU:  ECHO: Patient is a 63y old  Male who presents with a chief complaint of SOB, fever, weakness (2019 08:28)      HPI:  62 yo male  PMHx: Htn, Malaria (Recently diagnosed on ATovaquin/Proguanil)  CC: SOB, fever, weakness  History dates back to  when patient started feeling weak with intermittent fevers, chills and shivering. Pt previously was in [place] in the month of october and had been on Chemoprophylaxis with Mefloquine. Pt had been feeling fine during his trip.   Once patient came back, he was feeling weak. As per the wife, patient felt feverish, (fever not documented) which would get better with Tylenol and then pt would have similar symptoms after a few hours.   Pt went to see the family doctor on Friday had blood test done and the CXR done on Saturday and he was put on Atovaquine-Proguanil 4 times a day for 3 days, which he completed the course.   Today, pt was having worsening symptoms with dizziness/lightheadedness and nausea this AM but no vomiting.   Pt denied chest pain, dyspnea, palpitations, cough, urinary symptoms, headaches, hematuria, hematochezia, trauma, LOC.  ER Course: Vitals on admission were /55, HR 39, T 97F, SpO2 100% on RA. EKG showed sinus rhythm with inferolateral ST elevation and PVCs. Bedside echo showed lateral wall hypokineses and pericardial effusion. Troponin was 10.98. Code STEMI called. Pt underwent emergent LHC. Normal coronaries seen. (2019 09:52)   this morning patient went into cardiac arrest cpr done by cardiology team   swan pam placed mild elevation PA pressure and wedges pressur a consult was placed     PAST MEDICAL & SURGICAL HISTORY:  Malaria  Hypertension  No significant past surgical history    Allergies    No Known Allergies    Intolerances      Family history : no cardiovscular family history   Home Medications:  arginine 500 mg oral capsule: 1 cap(s) orally 2 times a day (2019 08:08)  atovaquone-proguanil: orally 4 times a day (2019 08:08)    Occupation:  Alochol: Denied  Smoking: Denied  Drug Use: Denied  Marital Status:         ROS: as in HPI; All other systems reviewed are negative    ICU Vital Signs Last 24 Hrs  T(C): 37.5 (2019 04:00), Max: 38.6 (2019 12:00)  T(F): 99.5 (2019 04:00), Max: 101.4 (2019 12:00)  HR: 96 (2019 07:30) (76 - 96)  BP: 120/85 (2019 06:00) (93/62 - 120/85)  BP(mean): 96 (2019 06:00) (74 - 96)  ABP: --  ABP(mean): --  RR: 39 (2019 06:00) (30 - 42)  SpO2: 100% (2019 07:30) (88% - 100%)        Physical Examination:    General: on sedation     HEENT: Pupils equal, reactive to light.  Symmetric.    PULM: b/l rhonchi     CVS: Regular rate and rhythm, no murmurs, rubs, or gallops    ABD: Soft, nondistended, nontender, normoactive bowel sounds, no masses    EXT: No edema, nontender, no clubbing     SKIN: Warm and well perfused, no rashes noted.    Neurology : no motor or sensory deficit     Musculoskeletal : move all extremity     Lymphatic system: no Palpable node       Mode: AC/ CMV (Assist Control/ Continuous Mandatory Ventilation)  RR (machine): 28  TV (machine): 450  FiO2: 100  PEEP: 5  ITime: 1  MAP: 16  PIP: 29      ABG - ( 2019 08:50 )  pH, Arterial: 7.20  pH, Blood: x     /  pCO2: 40    /  pO2: 94    / HCO3: 16    / Base Excess: -11.5 /  SaO2: 94                  I&O's Detail    2019 07:01  -  2019 07:00  --------------------------------------------------------  IN:    IV PiggyBack: 400 mL    Oral Fluid: 890 mL    sodium chloride 0.9%: 800 mL  Total IN: 2090 mL    OUT:    Voided: 875 mL  Total OUT: 875 mL    Total NET: 1215 mL            LABS:                        8.9    14.76 )-----------( 259      ( 2019 04:32 )             29.3     2019 04:00    130    |  89     |  20     ----------------------------<  147    4.8     |  20     |  1.0      Ca    6.9        2019 04:00  Mg     3.2       2019 04:00    TPro  6.1    /  Alb  3.4    /  TBili  2.8    /  DBili  x      /  AST  2508   /  ALT  2546   /  AlkPhos  116    2019 04:00  Amylase x     lipase x          CARDIAC MARKERS ( 2019 04:00 )  x     / 2.81 ng/mL / 986 U/L / x     / 34.4 ng/mL  CARDIAC MARKERS ( 2019 16:53 )  x     / x     / 955 U/L / x     / x      CARDIAC MARKERS ( 2019 04:32 )  x     / 5.95 ng/mL / x     / x     / x          CAPILLARY BLOOD GLUCOSE          Urinalysis Basic - ( 2019 10:20 )    Color: Erin / Appearance: Slightly Turbid / S.033 / pH: x  Gluc: x / Ketone: Negative  / Bili: Negative / Urobili: 3 mg/dL   Blood: x / Protein: 100 mg/dL / Nitrite: Negative   Leuk Esterase: Negative / RBC: 8 /HPF / WBC 1 /HPF   Sq Epi: x / Non Sq Epi: 2 /HPF / Bacteria: Negative      Culture    Culture - Blood (collected 2019 04:30)  Source: .Blood None  Preliminary Report (2019 16:01):    No growth to date.    Culture - Blood (collected 2019 00:25)  Source: .Blood Blood  Preliminary Report (2019 07:01):    No growth to date.    Culture - Blood (collected 2019 00:25)  Source: .Blood Blood  Preliminary Report (2019 07:01):    No growth to date.    Culture - Blood (collected 2019 13:00)  Source: .Blood Blood-Peripheral  Preliminary Report (2019 02:02):    No growth to date.      Lactate, Blood: 3.8 mmol/L (19 @ 16:53)  Lactate, Blood: 4.7 mmol/L (19 @ 04:32)  Lactate, Blood: 3.3 mmol/L (19 @ 04:30)  Lactate, Blood: 2.4 mmol/L (19 @ 13:00)      MEDICATIONS  (STANDING):  ALBUTerol    0.083%. 2.5 milliGRAM(s) Nebulizer once  aspirin 325 milliGRAM(s) Oral daily  cefTRIAXone   IVPB 2000 milliGRAM(s) IV Intermittent every 24 hours  chlorhexidine 4% Liquid 1 Application(s) Topical <User Schedule>  doxycycline IVPB 100 milliGRAM(s) IV Intermittent every 12 hours  doxycycline IVPB      heparin  Injectable 5000 Unit(s) SubCutaneous every 8 hours  hydrocortisone sodium succinate Injectable 100 milliGRAM(s) IV Push every 8 hours  magnesium sulfate Injectable 2 Gram(s) IV Push once  melatonin 5 milliGRAM(s) Oral at bedtime  midazolam Infusion 0.02 mG/kG/Hr (1.232 mL/Hr) IV Continuous <Continuous>  norepinephrine Infusion 0.05 MICROgram(s)/kG/Min (2.888 mL/Hr) IV Continuous <Continuous>  sodium bicarbonate  Injectable 50 milliEquivalent(s) IV Push once  vancomycin  IVPB 1000 milliGRAM(s) IV Intermittent every 12 hours  vancomycin  IVPB        MEDICATIONS  (PRN):  acetaminophen   Tablet .. 650 milliGRAM(s) Oral every 6 hours PRN Temp greater or equal to 38C (100.4F)  guaiFENesin    Syrup 200 milliGRAM(s) Oral every 6 hours PRN Cough        RADIOLOGY: ***     CXR: b/l opacity   TLC:  OG:  ET tube:        CAM ICU:  ECHO:

## 2019-11-29 NOTE — PROGRESS NOTE ADULT - SUBJECTIVE AND OBJECTIVE BOX
SUBJECTIVE:    Patient is a 63y old Male who presents with a chief complaint of SOB, fever, weakness (2019 11:57)    Currently admitted to medicine with the primary diagnosis of STEMI (ST elevation myocardial infarction)     Today is hospital day 3d. Overnight, pt became tachypneic, hypoxic. XR chest showed worsening congestion. Metabolic acidosis noted, lactate of 11. He received bicarb push per renal. In the morning, pt was having trouble breathing. Pt became unresponsive and code blue was called. Pt intubated and resuscitated adequately, Levophed initiated. Pt evaluated by GI, CT surg, Nephrology, Infectious Disease, and EP. No ecmo at the moment. Pt sedated with fentanyl.     PAST MEDICAL & SURGICAL HISTORY  Malaria  Hypertension  No significant past surgical history    SOCIAL HISTORY:  Negative for smoking/alcohol/drug use.     ALLERGIES:  No Known Allergies    MEDICATIONS:  STANDING MEDICATIONS  cefTRIAXone   IVPB 2000 milliGRAM(s) IV Intermittent every 24 hours  chlorhexidine 4% Liquid 1 Application(s) Topical <User Schedule>  doxycycline IVPB 100 milliGRAM(s) IV Intermittent every 12 hours  doxycycline IVPB      fentaNYL   Infusion. 0.5 MICROgram(s)/kG/Hr IV Continuous <Continuous>  heparin  Injectable 5000 Unit(s) SubCutaneous every 8 hours  hydrocortisone sodium succinate Injectable 100 milliGRAM(s) IV Push every 8 hours  melatonin 5 milliGRAM(s) Oral at bedtime  norepinephrine Infusion 0.05 MICROgram(s)/kG/Min IV Continuous <Continuous>  pantoprazole  Injectable 40 milliGRAM(s) IV Push two times a day  vancomycin  IVPB 1000 milliGRAM(s) IV Intermittent every 12 hours  vancomycin  IVPB        PRN MEDICATIONS  guaiFENesin    Syrup 200 milliGRAM(s) Oral every 6 hours PRN    VITALS:   T(F): 99.7  HR: 72  BP: 176/79  RR: 35  SpO2: 100%    LABS:                        7.5    25.22 )-----------( 203      ( 2019 09:27 )             24.9     -    129<L>  |  85<L>  |  25<H>  ----------------------------<  192<H>  4.2   |  16<L>  |  1.5    Ca    6.8<L>      2019 09:27  Phos  5.8       Mg     3.9     11-29    TPro  5.8<L>  /  Alb  3.1<L>  /  TBili  3.0<H>  /  DBili  x   /  AST  3645<H>  /  ALT  3147<H>  /  AlkPhos  118<H>      PT/INR - ( 2019 10:30 )   PT: 27.00 sec;   INR: 2.37 ratio         PTT - ( 2019 10:30 )  PTT:34.2 sec  Urinalysis Basic - ( 2019 10:20 )    Color: Erin / Appearance: Slightly Turbid / S.033 / pH: x  Gluc: x / Ketone: Negative  / Bili: Negative / Urobili: 3 mg/dL   Blood: x / Protein: 100 mg/dL / Nitrite: Negative   Leuk Esterase: Negative / RBC: 8 /HPF / WBC 1 /HPF   Sq Epi: x / Non Sq Epi: 2 /HPF / Bacteria: Negative      ABG - ( 2019 12:49 )  pH, Arterial: 7.46  pH, Blood: x     /  pCO2: 30    /  pO2: 84    / HCO3: 21    / Base Excess: -2.0  /  SaO2: 96            Creatine Kinase, Serum: 1008 U/L <H> (19 @ 09:31)  Troponin T, Serum: 3.85 ng/mL <HH> (19 @ 09:31)  Lactate, Blood: 13.8 mmol/L <HH> (19 @ 09:27)  Creatine Kinase, Serum: 986 U/L <H> (19 @ 04:00)  Troponin T, Serum: 2.81 ng/mL <HH> (19 @ 04:00)  Lactate, Blood: 3.8 mmol/L <H> (19 @ 16:53)  Creatine Kinase, Serum: 955 U/L <H> (19 @ 16:53)      Culture - Blood (collected 2019 04:30)  Source: .Blood None  Preliminary Report (2019 16:01):    No growth to date.    Culture - Blood (collected 2019 00:25)  Source: .Blood Blood  Preliminary Report (2019 07:01):    No growth to date.    Culture - Blood (collected 2019 00:25)  Source: .Blood Blood  Preliminary Report (2019 07:01):    No growth to date.      CARDIAC MARKERS ( 2019 09:31 )  x     / 3.85 ng/mL / 1008 U/L / x     / 39.4 ng/mL  CARDIAC MARKERS ( 2019 04:00 )  x     / 2.81 ng/mL / 986 U/L / x     / 34.4 ng/mL  CARDIAC MARKERS ( 2019 16:53 )  x     / x     / 955 U/L / x     / x      CARDIAC MARKERS ( 2019 04:32 )  x     / 5.95 ng/mL / x     / x     / x          RADIOLOGY:  XR Chest 19:  "Pulmonary edema. Support devices as described."    PHYSICAL EXAM:  GEN: No acute distress  LUNGS: BL breath sounds; no wheeze   HEART: S1/S2 present. RRR.  ABD: Soft, non-tender, non-distended. Bowel sounds present  MSK: No edema  NEURO: intubated, sedated

## 2019-11-29 NOTE — CONSULT NOTE ADULT - SUBJECTIVE AND OBJECTIVE BOX
Patient is a 63y old  Male who presents with a chief complaint of SOB, fever, weakness (2019 19:18)      HPI:  64 yo male  PMHx: Htn, Malaria (Recently diagnosed on ATovaquin/Proguanil)  CC: SOB, fever, weakness  History dates back to  when patient started feeling weak with intermittent fevers, chills and shivering. Pt previously was in [place] in the month of october and had been on Chemoprophylaxis with Mefloquine. Pt had been feeling fine during his trip.   Once patient came back, he was feeling weak. As per the wife, patient felt feverish, (fever not documented) which would get better with Tylenol and then pt would have similar symptoms after a few hours.   Pt went to see the family doctor on Friday had blood test done and the CXR done on Saturday and he was put on Atovaquine-Proguanil 4 times a day for 3 days, which he completed the course.   Today, pt was having worsening symptoms with dizziness/lightheadedness and nausea this AM but no vomiting.   Pt denied chest pain, dyspnea, palpitations, cough, urinary symptoms, headaches, hematuria, hematochezia, trauma, LOC.  ER Course: Vitals on admission were /55, HR 39, T 97F, SpO2 100% on RA. EKG showed sinus rhythm with inferolateral ST elevation and PVCs. Bedside echo showed lateral wall hypokineses and pericardial effusion. Troponin was 10.98. Code STEMI called. Pt underwent emergent LHC. Normal coronaries seen. (2019 09:52)         PAST MEDICAL & SURGICAL HISTORY:  Malaria  Hypertension  No significant past surgical history          FAMILY HISTORY:  No pertinent family history in first degree relatives    Allergies    No Known Allergies    Intolerances    Height (cm): 172.72 (19 @ 11:53)  Weight (kg): 61.6 (19 @ 11:53)  BMI (kg/m2): 20.6 (19 @ 11:53)  BSA (m2): 1.73 (19 @ 11:53)      HOME MEDICATIONS:  arginine 500 mg oral capsule: 1 cap(s) orally 2 times a day (2019 08:08)  atovaquone-proguanil: orally 4 times a day (2019 08:08)  cefTRIAXone 2 g intravenous injection: 2 gram(s) intravenous every 24 hours (2019 14:50)  doxycycline 100 mg injection: 100 milligram(s) injectable 2 times a day (2019 14:50)  fentaNYL: 0.5 mcg/kg/hr (:50)  guaiFENesin 100 mg/5 mL oral liquid: 10 milliliter(s) orally every 6 hours, As needed, Cough (:50)  heparin: 5000 unit(s) subcutaneous every 8 hours (:50)  hydrocortisone: 100 milligram(s) injectable every 8 hours (:50)  melatonin 5 mg oral tablet: 1 tab(s) orally once a day (at bedtime) (:50)  norepinephrine: 0.05 mg/kg/min (:50)  pantoprazole 40 mg intravenous injection: 40 milligram(s) intravenous 2 times a day (:50)      Vital Signs Last 24 Hrs  T(C): 37.2 (2019 20:00), Max: 37.7 (2019 07:30)  T(F): 99 (2019 20:00), Max: 99.8 (2019 07:30)  HR: 67 (2019 20:00) (66 - 98)  BP: 101/69 (2019 19:00) (101/69 - 176/79)  BP(mean): 80 (2019 19:00) (79 - 98)  RR: 24 (2019 20:00) (24 - 64)  SpO2: 98% (2019 20:00) (92% - 100%)    PHYSICAL EXAM  General: intubated, sedated  HEENT: clear oropharynx, anicteric sclera, pink conjunctiva  Neck: supple  CV: normal S1/S2   Lungs: positive air movement b/l ant   Abdomen: soft no hepatosplenomegaly  Ext: no clubbing cyanosis or edema  Skin: no rashes and no petechiae      MEDICATIONS  (STANDING):  cefTRIAXone   IVPB 2000 milliGRAM(s) IV Intermittent every 24 hours  chlorhexidine 4% Liquid 1 Application(s) Topical <User Schedule>  doxycycline IVPB 100 milliGRAM(s) IV Intermittent every 12 hours  doxycycline IVPB      fentaNYL   Infusion. 0.5 MICROgram(s)/kG/Hr (3.08 mL/Hr) IV Continuous <Continuous>  heparin  Injectable 5000 Unit(s) SubCutaneous every 8 hours  hydrocortisone sodium succinate Injectable 100 milliGRAM(s) IV Push every 8 hours  melatonin 5 milliGRAM(s) Oral at bedtime  norepinephrine Infusion 0.05 MICROgram(s)/kG/Min (2.888 mL/Hr) IV Continuous <Continuous>  pantoprazole  Injectable 40 milliGRAM(s) IV Push two times a day    MEDICATIONS  (PRN):  guaiFENesin    Syrup 200 milliGRAM(s) Oral every 6 hours PRN Cough      LABS:                          7.5    28.05 )-----------( 225      ( 2019 19:04 )             24.0         Mean Cell Volume : 97.2 fL  Mean Cell Hemoglobin : 30.4 pg  Mean Cell Hemoglobin Concentration : 31.3 g/dL  Auto Neutrophil # : 21.53 K/uL  Auto Lymphocyte # : 1.54 K/uL  Auto Monocyte # : 1.67 K/uL  Auto Eosinophil # : 0.01 K/uL  Auto Basophil # : 0.09 K/uL  Auto Neutrophil % : 76.8 %  Auto Lymphocyte % : 5.5 %  Auto Monocyte % : 6.0 %  Auto Eosinophil % : 0.0 %  Auto Basophil % : 0.3 %      Serial CBC's   @ 19:04  Hct-24.0 / Hgb-7.5 / Plat-225 / RBC-2.47 / WBC-28.05  Serial CBC's   @ 14:41  Hct-21.4 / Hgb-6.8 / Plat-198 / RBC-2.21 / WBC-24.90  Serial CBC's   @ 10:30  Hct--- / Hgb--- / Plat--- / RBC-2.41 / WBC---  Serial CBC's   @ 09:27  Hct-24.9 / Hgb-7.5 / Plat-203 / RBC-2.48 / WBC-25.22  Serial CBC's   @ 04:32  Hct-29.3 / Hgb-8.9 / Plat-259 / RBC-3.00 / WBC-14.76  Serial CBC's   @ 04:30  Hct-32.6 / Hgb-10.2 / Plat-315 / RBC-3.40 / WBC-13.44  Serial CBC's   @ 09:20  Hct-30.9 / Hgb-9.7 / Plat-247 / RBC-3.19 / WBC-7.62          129<L>  |  85<L>  |  25<H>  ----------------------------<  192<H>  4.2   |  16<L>  |  1.5    Ca    6.8<L>      2019 09:27  Phos  5.8       Mg     3.9         TPro  5.8<L>  /  Alb  3.1<L>  /  TBili  3.0<H>  /  DBili  x   /  AST  3645<H>  /  ALT  3147<H>  /  AlkPhos  118<H>        PT/INR - ( 2019 19:04 )   PT: 27.20 sec;   INR: 2.39 ratio         PTT - ( 2019 19:04 )  PTT:34.0 sec    Reticulocyte Percent: 2.8 % ( @ 10:30)  Ferritin, Serum: 3597 ng/mL ( @ 11:09)  Reticulocyte Percent: 1.2 % ( @ 13:00)              Urinalysis Basic - ( 2019 10:20 )    Color: Erin / Appearance: Slightly Turbid / S.033 / pH: x  Gluc: x / Ketone: Negative  / Bili: Negative / Urobili: 3 mg/dL   Blood: x / Protein: 100 mg/dL / Nitrite: Negative   Leuk Esterase: Negative / RBC: 8 /HPF / WBC 1 /HPF   Sq Epi: x / Non Sq Epi: 2 /HPF / Bacteria: Negative          Culture - Blood (collected 2019 04:30)  Source: .Blood None  Preliminary Report (2019 16:01):    No growth to date.    Culture - Blood (collected 2019 00:25)  Source: .Blood Blood  Preliminary Report (2019 07:01):    No growth to date.    Culture - Blood (collected 2019 00:25)  Source: .Blood Blood  Preliminary Report (2019 07:01):    No growth to date.      RADIOLOGY & ADDITIONAL STUDIES:  < from: US Abdomen Limited (19 @ 18:27) >  IMPRESSION:    No cholelithiasis or sonographic evidence of acute cholecystitis. No   biliary ductal dilatation.    Trace pericholecystic fluid, nonspecific, but can be explained by   patient's critical condition.      < end of copied text >    < from: Xray Chest 1 View- PORTABLE-Routine (19 @ 13:47) >    Impression:      Cardiomegaly with bilateral opacifications, support devices as described.      < end of copied text >    < from: Transthoracic Echocardiogram (19 @ 09:31) >  Summary:   1. LV Ejection Fraction by Watson's Method with a biplane EF of 30 %.   2. Mild concentric left ventricular hypertrophy.   3. Severely reduced RV systolic function.   4. Small to moderate pericardial effusion.   5. Mild thickening of the anterior and posterior mitral valve leaflets.   6. Moderate mitral valve regurgitation.   7. Moderate-severe tricuspid regurgitation.   8. Moderate pulmonic valve regurgitation.   9. Estimated pulmonary artery systolic pressure is 65.4 mmHg assuming a   right atrial pressure of 10 mmHg, which is consistent with severe   pulmonary hypertension.    < end of copied text >

## 2019-11-29 NOTE — DISCHARGE NOTE PROVIDER - NSDCMRMEDTOKEN_GEN_ALL_CORE_FT
arginine 500 mg oral capsule: 1 cap(s) orally 2 times a day  atovaquone-proguanil: orally 4 times a day  cefTRIAXone 2 g intravenous injection: 2 gram(s) intravenous every 24 hours  doxycycline 100 mg injection: 100 milligram(s) injectable 2 times a day  fentaNYL: 0.5 mcg/kg/hr  guaiFENesin 100 mg/5 mL oral liquid: 10 milliliter(s) orally every 6 hours, As needed, Cough  heparin: 5000 unit(s) subcutaneous every 8 hours  hydrocortisone: 100 milligram(s) injectable every 8 hours  melatonin 5 mg oral tablet: 1 tab(s) orally once a day (at bedtime)  norepinephrine: 0.05 mg/kg/min  pantoprazole 40 mg intravenous injection: 40 milligram(s) intravenous 2 times a day

## 2019-11-29 NOTE — PROGRESS NOTE ADULT - ASSESSMENT
Myocarditis  respiratory failure/ ARDS  long QT  PVC    - EKG had significantly improved after administrating steroids. QRS narrowed, no further PVC or NSVT  - ID note appreciated - origin of myocarditis consistent with viral vs inflammatory process. please consider endocardial biopsy for diagnosis once patient more stable  - Heart failure and CT  surgery on boards and case discussed. Patient may require hemodynamic support  and better oxygenation. Possible ECMO

## 2019-11-29 NOTE — PROCEDURE NOTE - NSPOSTCAREGUIDE_GEN_A_CORE
Care for catheter as per unit/ICU protocols/Keep the cast/splint/dressing clean and dry
Instructed patient/caregiver regarding signs and symptoms of infection/Care for catheter as per unit/ICU protocols/Keep the cast/splint/dressing clean and dry
Care for catheter as per unit/ICU protocols
Care for catheter as per unit/ICU protocols

## 2019-11-29 NOTE — CONSULT NOTE ADULT - ASSESSMENT
63 yr old male patient with recent travel to [place], brought in to the hospital for weakness, cyclical fevers, Dyspnea. He was found to have STEMI , LCH was negative. Workup ongoing for possible myopericarditis of infectious etiology. This morning, patient arrested , with ROSC . Currently intubated and on pressors . He is being transferred to [facility] for possible ECMO. Hematology called stat for suspicion of DIC    # Suspicion of DIC / Hemolytic anemia  - No evidence of acute DIC, however he might have a low grade DIC with normal platelets count, normal PTT, high normal fibrinogen and mildly elevated D dimer .   - For hemolytic anemia? : Peripheral smear reviewed , no schistocytes seen . LDH elevated ( also hemolyzed) , retic count not adequately high , Workup incomplete  - c/w medical treatment , and treat underlying cause.    No further recommendations from hematology standpoint   - Repeat hemolysis and DIC panel tonight    Case seen and discussed with attending

## 2019-11-29 NOTE — PROCEDURE NOTE - NSINFORMCONSENT_GEN_A_CORE
This was an emergent procedure.
This was an emergent procedure./code blue
This was an emergent procedure.
This was an emergent procedure./Code blue

## 2019-11-29 NOTE — CONSULT NOTE ADULT - ASSESSMENT
62 yo male with PMh of HTN presents with SOB and chills, admitted with cardiomyopathy and pericardial effusion of unknown etiology and found to have elevated LFTs.    # Elevated LFTS: Hepatocellular pattern  Atovaquone/proguanil/mefloquine does not cause this high Lfts  Possible ischemic hepatitis vs infectious etiology vs autoimmune vs other etiology      Rec:  Please perform a CLD work up which includes HAV IgM/IgG, HBsAg, HBsAb, HBcAb IgM/IgG, HCV Ab, Anti HEV, Serum Ferritin, Transferrin Saturation, Ceruloplasmin level, JAD, SMA, gamma globulin, AMA, EBV, HSV, CMV titres also, anti-microsomal Ab.  Twice daily INR and LFTs  Maintain MAP > 65.  get US duplex liver to R/O Wesley Chifrancisco. 62 yo male with PMh of HTN presents with SOB and chills, admitted with cardiomyopathy and pericardial effusion of unknown etiology and found to have elevated LFTs.    # Elevated LFTS: Hepatocellular pattern  Atovaquone/proguanil/mefloquine does not cause this high Lfts  Possible ischemic hepatitis vs infectious etiology vs autoimmune vs other etiology of liver disease.      Rec:  Please perform a CLD work up which includes HAV IgM/IgG, HBsAg, HBsAb, HBcAb IgM/IgG, HCV Ab, Anti HEV, Serum Ferritin, Transferrin Saturation, Ceruloplasmin level, JAD, SMA, gamma globulin, AMA, EBV, HSV, CMV titres also, anti-microsomal Ab.  Twice daily INR and LFTs  Maintain MAP > 65.  get US duplex liver to R/O Wesley Chiari. 64 yo male with PMh of HTN presents with SOB and chills, admitted with cardiomyopathy and pericardial effusion of unknown etiology and found to have elevated LFTs.    # Elevated LFTS: Hepatocellular pattern  Atovaquone/proguanil/mefloquine does not cause such elevatedLfts  most likely ischemic hepatitis vs secondary to yet undiscovered underlying infection vs. infectious etiology vs autoimmune vs other etiology of liver disease.      Rec:  Please perform a CLD work up which includes HAV IgM/IgG, HBsAg, HBsAb, HBcAb IgM/IgG, HCV Ab, Anti HEV, Serum Ferritin, Transferrin Saturation, Ceruloplasmin level, JAD, SMA, gamma globulin, AMA, EBV, HSV, CMV titres   Twice daily INR and LFTs  Maintain MAP > 65.  get US duplex liver to R/O Budd Chiari.

## 2019-11-29 NOTE — CONSULT NOTE ADULT - CONSULT REASON
Code STEMI
Elevated liver enzymes
LV dysfunction/ myopericarditis
acidosis
arrhythmia
malaria
possible ECMO
shock
DIC

## 2019-11-29 NOTE — PROGRESS NOTE ADULT - ASSESSMENT
#Weakness, cyclical fevers, Dyspnea -r/o myopericarditis of infectious etiology  - EKG: Sinus rhythm with inferolateral ST elevations and PVCs; Troponin 10.98; CXR unremarkable  - S/P Emergent LHC- Normal coronaries.   - Code blue called this morning 11/29/19. ROSC achieved.   - Rule out malaria as the possible infectious cause. Peripheral parasite blood smear neg x2  - f/u hemolysis panel, retic count, LDH, ESR  - ID onboard  - Ceftriaxone 2g q24h IV  - Doxycycline 100mg IV q12  - Vancomycin 1000mg q12  - F/u GI PCR, Hep B+C PCR, HIV PCR, tick PCR, plasmodium PCR   - F/u Chuy mountain spotted fever IgG, Quantiferon plus TB, Q fever antibody, Hep E IgG/IgE, trypanosoma cruzzi Ab, mycoplasma IgG/IgM, haptoglobin, coxsackie A/B Ab  - U/S abdomen   - Peripheral smear for inclusion bodies?  - Repeat ECHO  -Dr. Mcclendon recommended starting aspirin treatment so we started 325 mg PO daily. Hydrocortisone 100 mg IV q8hrs      #HTN- Monitor for now; DASH diet    #DVT ppx: Heparin sq  #GI ppx: PPI  #Diet: NPO  #Activity: IAT  #FULL CODE  #DIspo: CCU

## 2019-11-29 NOTE — PROCEDURE NOTE - NSPROCDETAILS_GEN_ALL_CORE
location identified, draped/prepped, sterile technique used, needle inserted/introduced/connected to a pressurized flush line/hemostasis with direct pressure, dressing applied/Seldinger technique/positive blood return obtained via catheter/sutured in place/all materials/supplies accounted for at end of procedure
lumen(s) aspirated and flushed/guidewire recovered/sterile technique, catheter placed/ultrasound guidance/sterile dressing applied

## 2019-11-29 NOTE — PROCEDURE NOTE - NSINDICATIONS_GEN_A_CORE
monitoring purposes/blood sampling/critical patient
critical illness
emergency venous access/hemodynamic monitoring/critical illness
monitoring purposes/critical patient

## 2019-11-29 NOTE — PROGRESS NOTE ADULT - SUBJECTIVE AND OBJECTIVE BOX
Interval history:  Patient went into cardiac arrest this am likely driven by acidosis and respiratory failure.         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 He recently returned from a trip to [facility] and visited his PMD after developing symptoms of fever, fatigue, chills. He was prescribed treatment for malaria, however, his symptoms persistent and he decided to come to the hospital.      Upon arrival to ED, a STEMI code was activated due to ST segment elevations on his ECG. He underwent LHC with normal coronaries. A TTE showed LV systolic dysfunction with LVEF ~ 35%, elevated filling pressures and pericardial effusion but no clear e/o tamponade.     His cardiac enzymes peaked at 10 with lactic acid 3.3 and elevated LFTs.       On further questioning, patient reports being an active person, he is actually a long distance runner and didn't have any difficulty with physical exertion prior to this episode. He says several years ago (6-8) he had episode of syncope and was admitted to [facility] in [place] but the work up was negative. He was diagnosed with HTN.       PMH: HTN     Social: Denies ETOH, smoking or drugs       FH: Mother is alive in her 80s, no h/o heart disease.

## 2019-11-29 NOTE — CONSULT NOTE ADULT - REASON FOR ADMISSION
SOB, fever, weakness
Code STEMI
SOB, fever, weakness

## 2019-11-29 NOTE — CHART NOTE - NSCHARTNOTEFT_GEN_A_CORE
Was called by Dr. Scott that the Pt's Hgb at 0241 pm, is 6.8. A Stat Repeat is 7.5.   Transport to [facility] is here but I am delaying it until patient is transfused one unit of PRBC as per Dr. Scott & Dr. Shankar, both were notified, as well as on call Hospitalist Dr. Heller.    Stat DIC panel was also sent, and a stat Hematology consult was placed, fellow Dr. Cabezas, and Attending Dr. Mcclain came bedside, they will review smear but do not believe pt to be in DIC.    PLAN:  Transfuse 1U PRBC. Was called by Dr. Scott that the Pt's Hgb at 0241 pm, is 6.8. A Stat Repeat is 7.5.   Transport to [facility] is here but I am delaying it until patient is transfused one unit of PRBC as per Dr. Scott & Dr. Shankar, both were notified, as well as on call Hospitalist Dr. Heller.    Stat DIC panel was also sent, and a stat Hematology consult was placed, fellow Dr. Cabezas, and Attending Dr. Mcclain came bedside, they will review smear but do not believe pt to be in DIC.    PLAN:  Transfuse 1U PRBC.  Transport to [facility] afterwards Was called by Dr. Scott that the Pt's Hgb at 0241 pm, is 6.8. A Stat Repeat is 7.5.   Transport to [facility] is here but I am delaying it until patient is transfused one unit of PRBC as per Dr. Scott & Dr. Shankar, both were notified, as well as on call Hospitalist Dr. Heller.    Stat DIC panel was also sent, and a stat Hematology consult was placed, fellow Dr. Cabezas, and Attending Dr. Mcclain came bedside, they will review smear but do not believe pt to be in DIC.    PLAN:  Transfuse 1U PRBC.  Transport to [facility] afterwards    On Call ICU Resident  Tay Jasmine PGY3. [phone]

## 2019-11-29 NOTE — CONSULT NOTE ADULT - ASSESSMENT
Impression:  Acute hypoxic respiratory failure  Flash pulmonary edema  Cardiogenic shock  HO Malaria  LActic aciosis with APURVA      PLAN:    CNS: Sedation with fentanyl.     HEENT:  Oral care    PULMONARY:  HOB @ 45 degrees. Fio2 to 80% Increase Rate to 35. peep 8   ABG in 30 minutes     CARDIOVASCULAR: keep MAP > 65 if needed Levophed. Add dobutamine if needed. Cardiology follow up.   follow Ct surgery already on board   ??ECMO    GI: GI prophylaxis. NPO                                         RENAL:  F/u  lytes.  Correct as needed. accurate I/O.  renal consult follow possible CVVH in no improve in acidosis and urine output     INFECTIOUS DISEASE: Continue with Abx for now as per ID   follow ID   HEMATOLOGICAL:  DVT prophylaxis. send cbc , pt p, ptt   follow smear     ENDOCRINE:  Follow up FS.  Insulin protocol if needed.    MUSCULOSKELETAL:    CODE STATUS: FULL CODE    DISPOSITION: Pt requires continued monitoring in the MICU    case discussed with Cardiology and ct surgery Impression:  Acute hypoxic respiratory failure s/p cardiac arrest   Flash pulmonary edema  Cardiogenic shock/ ?? septic   HO Malaria  LActic aciosis with APURVA      PLAN:    CNS: Sedation with fentanyl.     HEENT:  Oral care    PULMONARY:  HOB @ 45 degrees. Fio2 to 80% Increase Rate to 35. peep 8   ABG in 30 minutes     CARDIOVASCULAR: keep MAP > 65 if needed Levophed. Add dobutamine if needed. Cardiology follow up.   follow Ct surgery already on board   ??ECMO  follow swan pam reading   apply CHEETAH     GI: GI prophylaxis. NPO                                         RENAL:  F/u  lytes.  Correct as needed. accurate I/O.  renal consult follow possible CVVH in no improve in acidosis and urine output     INFECTIOUS DISEASE: Continue with Abx for now as per ID   follow ID   HEMATOLOGICAL:  DVT prophylaxis. send cbc , pt p, ptt   follow smear     ENDOCRINE:  Follow up FS.  Insulin protocol if needed.    MUSCULOSKELETAL:    CODE STATUS: FULL CODE    DISPOSITION: Pt requires continued monitoring in the MICU    case discussed with Cardiology and ct surgery

## 2019-11-29 NOTE — CONSULT NOTE ADULT - PROVIDER SPECIALTY LIST ADULT
Heme/Onc
CT Surgery
Gastroenterology
Cardiology
Critical Care
Electrophysiology
Nephrology
Infectious Disease
Heart Failure

## 2019-11-29 NOTE — DISCHARGE NOTE PROVIDER - NSDCCPCAREPLAN_GEN_ALL_CORE_FT
PRINCIPAL DISCHARGE DIAGNOSIS  Diagnosis: Myocarditis  Assessment and Plan of Treatment:       SECONDARY DISCHARGE DIAGNOSES  Diagnosis: Respiratory failure  Assessment and Plan of Treatment:

## 2019-11-29 NOTE — PROGRESS NOTE ADULT - SUBJECTIVE AND OBJECTIVE BOX
INTERVAL HPI/OVERNIGHT EVENTS:            MEDICATIONS  (STANDING):  ALBUTerol    0.083%. 2.5 milliGRAM(s) Nebulizer once  cefTRIAXone   IVPB 2000 milliGRAM(s) IV Intermittent every 24 hours  chlorhexidine 4% Liquid 1 Application(s) Topical <User Schedule>  doxycycline IVPB 100 milliGRAM(s) IV Intermittent every 12 hours  doxycycline IVPB      fentaNYL   Infusion. 0.5 MICROgram(s)/kG/Hr (3.08 mL/Hr) IV Continuous <Continuous>  heparin  Injectable 5000 Unit(s) SubCutaneous every 8 hours  hydrocortisone sodium succinate Injectable 100 milliGRAM(s) IV Push every 8 hours  melatonin 5 milliGRAM(s) Oral at bedtime  norepinephrine Infusion 0.05 MICROgram(s)/kG/Min (2.888 mL/Hr) IV Continuous <Continuous>  pantoprazole  Injectable 40 milliGRAM(s) IV Push two times a day  vancomycin  IVPB 1000 milliGRAM(s) IV Intermittent every 12 hours  vancomycin  IVPB        MEDICATIONS  (PRN):  guaiFENesin    Syrup 200 milliGRAM(s) Oral every 6 hours PRN Cough      Allergies    No Known Allergies    Intolerances    Vital Signs Last 24 Hrs  T(C): 37.7 (2019 07:30), Max: 37.7 (2019 14:00)  T(F): 99.8 (2019 07:30), Max: 99.8 (2019 14:00)  HR: 80 (2019 10:00) (79 - 98)  BP: 176/79 (2019 07:00) (93/62 - 176/79)  BP(mean): 98 (2019 07:00) (74 - 98)  RR: 30 (2019 10:00) (27 - 64)  SpO2: 94% (2019 10:00) (88% - 100%)    19 @ 07:01  -  19 @ 07:00  --------------------------------------------------------  IN: 2090 mL / OUT: 875 mL / NET: 1215 mL        Physical Exam    HEAD:  Atraumatic, Normocephalic  EYES: EOMI, sedated   ENMT: No tonsillar erythema, exudates, or enlargements; ist mucous membranes, Good dentition, No lesions  NECK: Supple and normal thyroid.  No JVD or carotid bruit.  Carotid pulse is 2+ bilaterally.  HEART: Regular rate and rhythm; No murmurs, rubs, or gallops.  PULMONARY: Clear to auscultation and perfusion.  No rales, wheezing, or rhonchi bilaterally.  ABDOMEN: Soft, Nontender, Nondistended; Bowel sounds present  EXTREMITIES:  2+ Peripheral Pulses, No clubbing, cyanosis, or edema  NEUROLOGICAL: sedated     LABS:                        7.5    25.22 )-----------( 203      ( 2019 09:27 )             24.9         129<L>  |  85<L>  |  25<H>  ----------------------------<  192<H>  4.2   |  16<L>  |  1.5    Ca    6.8<L>      2019 09:27  Phos  5.8       Mg     3.9         TPro  5.8<L>  /  Alb  3.1<L>  /  TBili  3.0<H>  /  DBili  x   /  AST  3645<H>  /  ALT  3147<H>  /  AlkPhos  118<H>      PT/INR - ( 2019 10:30 )   PT: 27.00 sec;   INR: 2.37 ratio         PTT - ( 2019 10:30 )  PTT:34.2 sec  Urinalysis Basic - ( 2019 10:20 )    Color: Erin / Appearance: Slightly Turbid / S.033 / pH: x  Gluc: x / Ketone: Negative  / Bili: Negative / Urobili: 3 mg/dL   Blood: x / Protein: 100 mg/dL / Nitrite: Negative   Leuk Esterase: Negative / RBC: 8 /HPF / WBC 1 /HPF   Sq Epi: x / Non Sq Epi: 2 /HPF / Bacteria: Negative        RADIOLOGY & ADDITIONAL TESTS:

## 2019-11-29 NOTE — PROGRESS NOTE ADULT - PROBLEM SELECTOR PLAN 1
Doubt infectious etiology   unlikely Rickettsial illness    given persistent fevers and symptoms - ? consider referral to tertiary care center for myocardial biopsy   d/w pt & spouse   ?? role for antiinflammatory agents   Empiric abx MIGHT be of NO use if neg final cx ( which is likely)
Patient started having symptoms of fever chills after coming from trip to [facility], patient was given prophylaxis for malaria prior to departure and treated empirically for it by PMD. Reportedly smear was negative twice.   Blood cultures have been negative so far and ID on following the patient. Would consider sending the following work up:     - HCV PCR  - HBV PCR  - HIV PCR   - GI PCR for echovirus   - PCR for [place] diseases  - Serology for trypanosoma cruzi, Dengue and Yellow fever    - Get another peripheral smear   - Testing for G6PD deficiency - The liver failure and hemolytic anemia might be explained by it and the meds for malaria could have triggered it.   - Get US abdomen to assess for hepatosplenomegaly   - Ask microbiology lab to keep cultures for slow growing organisms  - Continue doxy per ID and rest of their recs  - Case discussed with CCU team, ICU team, CT surgery and family  - Will continue to follow closely

## 2019-11-29 NOTE — PROGRESS NOTE ADULT - SUBJECTIVE AND OBJECTIVE BOX
KRISTAL WHEATLEY  63y, Male  Allergy: No Known Allergies      CHIEF COMPLAINT: SOB, fever, weakness (2019 07:23)      INTERVAL EVENTS/HPI  - Intubated and coded, lactate 10  - T(F): , Max: 101.4 (19 @ 12:00), since then afebrile  - BCX NG   - questionable seizure?  - started on steroids  - WBC Count: 14.76 (19 @ 04:32) <-- WBC Count: 13.44 (19 @ 04:30)  - Creatinine, Serum: 1.0 (19 @ 04:00)    ROS  unable to obtain history secondary to patient's mental status and/or sedation     VITALS:  T(F): 99.5, Max: 101.4 (19 @ 12:00)  HR: 96  BP: 120/85  RR: 39Vital Signs Last 24 Hrs  T(C): 37.5 (2019 04:00), Max: 38.6 (2019 12:00)  T(F): 99.5 (2019 04:00), Max: 101.4 (2019 12:00)  HR: 96 (2019 07:30) (76 - 96)  BP: 120/85 (2019 06:00) (93/62 - 120/85)  BP(mean): 96 (2019 06:00) (74 - 96)  RR: 39 (2019 06:00) (30 - 42)  SpO2: 100% (2019 07:30) (88% - 100%)    PHYSICAL EXAM:  Gen: intubated  HEENT: Normocephalic, atraumatic  Neck: supple, no lymphadenopathy  CV: Regular rate & regular rhythm  Lungs: coarse BS  Abdomen: Soft, BS present  Ext: no edema  Neuro: sedated  Skin: no rash, no erythema  Lines: no phlebitis    FH: Non-contributory  Social Hx: Non-contributory    TESTS & MEASUREMENTS:                        8.9    14.76 )-----------( 259      ( 2019 04:32 )             29.3     11-    130<L>  |  89<L>  |  20  ----------------------------<  147<H>  4.8   |  20  |  1.0    Ca    6.9<L>      2019 04:00  Mg     3.2         TPro  6.1  /  Alb  3.4<L>  /  TBili  2.8<H>  /  DBili  x   /  AST  2508<H>  /  ALT  2546<H>  /  AlkPhos  116<H>      eGFR if Non African American: 80 mL/min/1.73M2 (19 @ 04:00)  eGFR if : 93 mL/min/1.73M2 (19 @ 04:00)    LIVER FUNCTIONS - ( 2019 04:00 )  Alb: 3.4 g/dL / Pro: 6.1 g/dL / ALK PHOS: 116 U/L / ALT: 2546 U/L / AST: 2508 U/L / GGT: x           Urinalysis Basic - ( 2019 10:20 )    Color: Erin / Appearance: Slightly Turbid / S.033 / pH: x  Gluc: x / Ketone: Negative  / Bili: Negative / Urobili: 3 mg/dL   Blood: x / Protein: 100 mg/dL / Nitrite: Negative   Leuk Esterase: Negative / RBC: 8 /HPF / WBC 1 /HPF   Sq Epi: x / Non Sq Epi: 2 /HPF / Bacteria: Negative        Culture - Blood (collected 19 @ 04:30)  Source: .Blood None  Preliminary Report (19 @ 16:01):    No growth to date.    Culture - Blood (collected 19 @ 00:25)  Source: .Blood Blood  Preliminary Report (19 @ 07:01):    No growth to date.    Culture - Blood (collected 19 @ 00:25)  Source: .Blood Blood  Preliminary Report (19 @ 07:01):    No growth to date.    Culture - Blood (collected 19 @ 13:00)  Source: .Blood Blood-Peripheral  Preliminary Report (19 @ 02:02):    No growth to date.        Lactate, Blood: 3.8 mmol/L (19 @ 16:53)  Lactate, Blood: 4.7 mmol/L (19 @ 04:32)  Lactate, Blood: 3.3 mmol/L (19 @ 04:30)  Lactate, Blood: 2.4 mmol/L (19 @ 13:00)      INFECTIOUS DISEASES TESTING  HIV-1/2 Combo Result: Nonreact (19 @ 04:30)  HIV-1/2 Combo Result: Nonreact (19 @ 00:25)      RADIOLOGY & ADDITIONAL TESTS:  I have personally reviewed the last Chest xray  CXR      CT      CARDIOLOGY TESTING  12 Lead ECG:   Ventricular Rate 104 BPM    Atrial Rate 104 BPM    QRS Duration 182 ms    Q-T Interval 440 ms    QTC Calculation(Bezet) 578 ms    R Axis 137 degrees    T Axis 40 degrees    Diagnosis Line Wide QRS rhythm  Non-specific intra-ventricular conduction block  Possible Right ventricular hypertrophy  Cannot rule out Septal infarct , age undetermined  Lateral infarct , age undetermined  Inferior injury pattern  ** ** ACUTE MI / STEMI ** **  Abnormal ECG    Confirmed by Aamir Roe ([id number]) on 20198:30:25 AM (19 @ 06:58)  12 Lead ECG:   Ventricular Rate 116 BPM    Atrial Rate 127 BPM    P-R Interval 152 ms    QRS Duration 132 ms    Q-T Interval 364 ms    QTC Calculation(Bezet) 505 ms    P Axis 96 degrees    R Axis -49 degrees    T Axis 76 degrees    Diagnosis Line Undetermined rhythm  Left axis deviation  Non-specific intra-ventricular conduction block  Cannot rule out Anterior infarct , age undetermined  Lateral injury pattern  ** ** ACUTE MI / STEMI ** **  Abnormal ECG    Confirmed by Gordon Malone ([id number]) on 2019 9:09:10 AM (19 @ 07:25)      MEDICATIONS  ALBUTerol    0.083%. 2.5  aspirin 325  cefTRIAXone   IVPB 2000  chlorhexidine 4% Liquid 1  doxycycline IVPB 100  doxycycline IVPB   heparin  Injectable 5000  hydrocortisone sodium succinate Injectable 100  magnesium sulfate Injectable 2  melatonin 5  midazolam Infusion 0.02  norepinephrine Infusion 0.05  sodium bicarbonate  Injectable 50  vancomycin  IVPB 1000  vancomycin  IVPB       ANTIBIOTICS:  cefTRIAXone   IVPB 2000 milliGRAM(s) IV Intermittent every 24 hours  doxycycline IVPB 100 milliGRAM(s) IV Intermittent every 12 hours  doxycycline IVPB      vancomycin  IVPB 1000 milliGRAM(s) IV Intermittent every 12 hours  vancomycin  IVPB          All available historical records have been reviewed

## 2019-11-29 NOTE — CONSULT NOTE ADULT - SUBJECTIVE AND OBJECTIVE BOX
NEPHROLOGY CONSULTATION NOTE    Pt intubated on vent, hx taken from chart.    64 yo male with PMHx: Htn, Malaria (Recently diagnosed on ATovaquin/Proguanil)  presented to ED c/o SOB, fever, and  weakness. History dates back to  when patient started feeling weak with intermittent fevers, chills and shivering. Pt previously was in [place] in the month of october and had been on Chemoprophylaxis with Mefloquine. Pt had been feeling fine during his trip. Once patient came back, he was feeling weak. As per the wife, patient felt feverish, (fever not documented) which would get better with Tylenol and then pt would have similar symptoms after a few hours. He went to see the family doctor on , blood test done and CXR// done on Saturday and he was put on Atovaquine-Proguanil 4 times a day for 3 days, which he completed the course.   , pt having worsening symptoms with dizziness/lightheadedness and nausea. on arrival to ED, EKG showed sinus rhythm with inferolateral ST elevation and PVCs. Bedside echo showed lateral wall hypokineses and pericardial effusion. Troponin was 10.98. Code STEMI called. Pt underwent emergent LHC. Normal coronaries seen.   Patients condition worsened over next 2 days with rising LFT, LDH and decrease LV function, suggesting myocarditis is possible. Was seen by EP (Dr. Puente) and ID ( Dr.A. Morales) and HF (Dr. Shankar). (19)    s/p cardiac arrest today. intubated on 100% oxygen. Nephrology called for lactic acidosis.    PAST MEDICAL & SURGICAL HISTORY:  Malaria  Hypertension  No significant past surgical history    Allergies:  No Known Allergies    Home Medications:  arginine 500 mg oral capsule: 1 cap(s) orally 2 times a day (2019 08:08)  atovaquone-proguanil: orally 4 times a day (2019 08:08)    Hospital Medications:   MEDICATIONS  (STANDING):  ALBUTerol    0.083%. 2.5 milliGRAM(s) Nebulizer once  cefTRIAXone   IVPB 2000 milliGRAM(s) IV Intermittent every 24 hours  chlorhexidine 4% Liquid 1 Application(s) Topical <User Schedule>  doxycycline IVPB 100 milliGRAM(s) IV Intermittent every 12 hours  doxycycline IVPB      fentaNYL   Infusion. 0.5 MICROgram(s)/kG/Hr (3.08 mL/Hr) IV Continuous <Continuous>  heparin  Injectable 5000 Unit(s) SubCutaneous every 8 hours  hydrocortisone sodium succinate Injectable 100 milliGRAM(s) IV Push every 8 hours  magnesium sulfate Injectable 2 Gram(s) IV Push once  melatonin 5 milliGRAM(s) Oral at bedtime  norepinephrine Infusion 0.05 MICROgram(s)/kG/Min (2.888 mL/Hr) IV Continuous <Continuous>  pantoprazole   Suspension 40 milliGRAM(s) Oral daily  sodium bicarbonate  Injectable 50 milliEquivalent(s) IV Push once  vancomycin  IVPB 1000 milliGRAM(s) IV Intermittent every 12 hours  vancomycin  IVPB          SOCIAL HISTORY:  Denies ETOH,Smoking,   FAMILY HISTORY:  No pertinent family history in first degree relatives        REVIEW OF SYSTEMS:    All other review of systems is negative unless indicated above.    VITALS:  T(F): 99.8 (19 @ 07:30), Max: 101.4 (19 @ 12:00)  HR: 80 (19 @ 10:00)  BP: 176/79 (19 @ 07:00)  RR: 30 (19 @ 10:00)  SpO2: 94% (19 @ 10:00)     @ 07:01  -   @ 07:00  --------------------------------------------------------  IN: 2090 mL / OUT: 875 mL / NET: 1215 mL            I&O's Detail    2019 07:01  -  2019 07:00  --------------------------------------------------------  IN:    IV PiggyBack: 400 mL    Oral Fluid: 890 mL    sodium chloride 0.9%: 800 mL  Total IN: 2090 mL    OUT:    Voided: 875 mL  Total OUT: 875 mL    Total NET: 1215 mL        Creatine Kinase, Serum: 1008 U/L (19 @ 09:31)  Creatine Kinase, Serum: 986 U/L (19 @ 04:00)  Creatine Kinase, Serum: 955 U/L (19 @ 16:53)      PHYSICAL EXAM:  Constitutional: sedated, intubated on vent  Respiratory: on MV, + rhonchi  Cardiovascular: S1, S2, RRR  Gastrointestinal: distension +  Extremities: No peripheral edema  : + galeano.     Vascular Access:    LABS:      129<L>  |  85<L>  |  25<H>  ----------------------------<  192<H>  4.2   |  16<L>  |  1.5    Ca    6.8<L>      2019 09:27  Phos  5.8       Mg     3.9         TPro  5.8<L>  /  Alb  3.1<L>  /  TBili  3.0<H>  /  DBili      /  AST  3645<H>  /  ALT  3147<H>  /  AlkPhos  118<H>      Creatinine Trend: 1.5 <--, 1.0 <--, 1.0 <--, 1.1 <--, 1.4 <--                        7.5    25.22 )-----------( 203      ( 2019 09:27 )             24.9     Blood Gas Arterial, Lactate: 18.0 mmoL/L (11.29.19 @ 07:50)  Blood Gas Arterial, Lactate: 13.9: Notified Dr. Scott in person of all results @ aprx 09, read back mmoL/L (11.29.19 @ 08:50)  Blood Gas Profile - Arterial (11.29.19 @ 08:50)    pH, Arterial: 7.20: Notified Dr. Scott in person of all results @ aprx 09, read back    pCO2, Arterial: 40: Notified Dr. Scott in person of all results @ aprx 09, read back mmHg    pO2, Arterial: 94: Notified Dr. Scott in person of all results @ aprx 09, read back mmHg    HCO3, Arterial: 16: Notified Dr. Scott in person of all results @ aprx 09, read back mmoL/L    Base Excess, Arterial: -11.5: Notified Dr. Scott in person of all results @ aprx 09, read back mmoL/L    Oxygen Saturation, Arterial: 94: Notified Dr. Scott in person of all results @ aprx 09, read back %    FIO2, Arterial: 100: Notified Dr. Scott in person of all results @ aprx 09, read back      Troponin T, Serum: 3.85: Critical value: ng/mL (19 @ 09:31)  CKMB Units: 39.4 ng/mL (19 @ 09:31)    Urine Studies:  Urinalysis Basic - ( 2019 10:20 )    Color: Erin / Appearance: Slightly Turbid / S.033 / pH:   Gluc:  / Ketone: Negative  / Bili: Negative / Urobili: 3 mg/dL   Blood:  / Protein: 100 mg/dL / Nitrite: Negative   Leuk Esterase: Negative / RBC: 8 /HPF / WBC 1 /HPF   Sq Epi:  / Non Sq Epi: 2 /HPF / Bacteria: Negative      RADIOLOGY & ADDITIONAL STUDIES:  < from: Xray Chest 1 View- PORTABLE-Urgent (19 @ 09:26) >  Impression:      Pulmonary edema. Support devices as described.    < end of copied text >    < from: US Abdomen Limited (19 @ 22:14) >  IMPRESSION:    Abnormally thickened gallbladder. No gallstones.    < end of copied text >

## 2019-11-29 NOTE — PROGRESS NOTE ADULT - SUBJECTIVE AND OBJECTIVE BOX
SUBJ: Pt intubated.      MEDICATIONS  (STANDING):  calcium gluconate IVPB 1 Gram(s) IV Intermittent once  cefTRIAXone   IVPB 2000 milliGRAM(s) IV Intermittent every 24 hours  chlorhexidine 4% Liquid 1 Application(s) Topical <User Schedule>  doxycycline IVPB 100 milliGRAM(s) IV Intermittent every 12 hours  doxycycline IVPB      fentaNYL   Infusion. 0.5 MICROgram(s)/kG/Hr (3.08 mL/Hr) IV Continuous <Continuous>  heparin  Injectable 5000 Unit(s) SubCutaneous every 8 hours  hydrocortisone sodium succinate Injectable 100 milliGRAM(s) IV Push every 8 hours  melatonin 5 milliGRAM(s) Oral at bedtime  norepinephrine Infusion 0.05 MICROgram(s)/kG/Min (2.888 mL/Hr) IV Continuous <Continuous>  pantoprazole  Injectable 40 milliGRAM(s) IV Push two times a day    MEDICATIONS  (PRN):  guaiFENesin    Syrup 200 milliGRAM(s) Oral every 6 hours PRN Cough            Vital Signs Last 24 Hrs  T(C): 37.4 (29 Nov 2019 16:00), Max: 37.7 (29 Nov 2019 07:30)  T(F): 99.3 (29 Nov 2019 16:00), Max: 99.8 (29 Nov 2019 07:30)  HR: 70 (29 Nov 2019 17:00) (68 - 98)  BP: 105/68 (29 Nov 2019 17:00) (93/62 - 176/79)  BP(mean): 79 (29 Nov 2019 17:00) (74 - 98)  RR: 25 (29 Nov 2019 17:00) (25 - 64)  SpO2: 99% (29 Nov 2019 17:00) (88% - 100%)      ECG:NML    TTE:    LABS:                        6.8    24.90 )-----------( 198      ( 29 Nov 2019 14:41 )             21.4     11-29    129<L>  |  85<L>  |  25<H>  ----------------------------<  192<H>  4.2   |  16<L>  |  1.5    Ca    6.8<L>      29 Nov 2019 09:27  Phos  5.8     11-29  Mg     3.9     11-29    TPro  5.8<L>  /  Alb  3.1<L>  /  TBili  3.0<H>  /  DBili  x   /  AST  3645<H>  /  ALT  3147<H>  /  AlkPhos  118<H>  11-29    CARDIAC MARKERS ( 29 Nov 2019 09:31 )  x     / 3.85 ng/mL / 1008 U/L / x     / 39.4 ng/mL  CARDIAC MARKERS ( 29 Nov 2019 04:00 )  x     / 2.81 ng/mL / 986 U/L / x     / 34.4 ng/mL  CARDIAC MARKERS ( 28 Nov 2019 16:53 )  x     / x     / 955 U/L / x     / x      CARDIAC MARKERS ( 28 Nov 2019 04:32 )  x     / 5.95 ng/mL / x     / x     / x          PT/INR - ( 29 Nov 2019 10:30 )   PT: 27.00 sec;   INR: 2.37 ratio         PTT - ( 29 Nov 2019 10:30 )  PTT:34.2 sec    I&O's Summary    28 Nov 2019 07:01  -  29 Nov 2019 07:00  --------------------------------------------------------  IN: 2090 mL / OUT: 875 mL / NET: 1215 mL    29 Nov 2019 07:01  -  29 Nov 2019 17:50  --------------------------------------------------------  IN: 16.4 mL / OUT: 260 mL / NET: -243.6 mL      BNP

## 2019-11-29 NOTE — PROGRESS NOTE ADULT - ASSESSMENT
ASSESSMENT  62 yo M with HTN, recent travel to [place] 10/14-11/18 on mefloquine ppx that was started prior to travel, admitted with fever, weakness, SOB, found to have ST elevations, trop of 10 , s/p normal cath     IMPRESSION  #Intubated   #Elevated Trop/ST elevations with pericardial effusion c/w myopericarditis and transaminitis , now CXR with ARDS/volume overload    Possibly a side effect of mefloquine as can cause pericarditis and other cardiac side effects    R/o Q fever, Hep E, rickettsial disease, chagas, leptospira, dengue, other viral etiology . Unlikely typhoid as BCX NG x2. Lower suspicion for TB as acute picture    While initially concerning for severe malaria, parasite smear is negative and mefloquine ppx should have been sufficient & s/p malarone as outpatient (no malaria testing done as outpatient)    No URI sx to suggest a viral etiology, pt denied any other symptoms: diarrhea, joint pain, rash, CP, dysuria, only complaint was malaise and SOB    11/26 BCX NG, 11/27 BCX NG    HIV neg  #Transaminitis with now rising Tbili  #HFrEF EF 30-35%  #Lactate Dehydrogenase, Serum: 1424, Ferritin, Serum: 3597  #Anemia borderline macrocytic, stable   #Lactic acidosis    RECOMMENDATIONS  - doxy 100mg q12h IV  - send another parasite smear   - send trypanosoma testing, Toxo IgM, dengue Ab, leptospira ab, mycoplasma igm/igg, coxsackie Abs   - f/u coxiella Ab, Hep E IgM/IgG, omid mountain ab (rickettsia), plasmodium PCR, quantiferon gold   - Trend LFTs  - Will call [facility] hotline to see if fits with mefloquine-associated pericarditis. Per residents called hotline with no response.  - on steroids  - Elevated ferritin likely reactive, r/o HLH, consider Heme Onc Consult send IL-2, NK cell activity     Spectra [id number] ASSESSMENT  62 yo M with HTN, recent travel to [place] 10/14-11/18 on mefloquine ppx that was started prior to travel (visited [facility], [facility] in [facility]), admitted with fever, weakness, SOB, found to have ST elevations, trop of 10 , s/p normal cath     IMPRESSION  #Intubated   #Elevated Trop/ST elevations with pericardial effusion c/w myopericarditis and transaminitis , now CXR with ARDS/volume overload    Possibly a side effect of mefloquine as can cause pericarditis and other cardiac side effects    R/o Yellow fever (no vaccine), Q fever, Hep E, rickettsial disease, chagas, leptospira, dengue, other viral etiology . Unlikely typhoid as BCX NG x2. Lower suspicion for TB as acute picture    While initially concerning for severe malaria, parasite smear is negative and mefloquine ppx should have been sufficient & s/p malarone as outpatient (no malaria testing done as outpatient)    No URI sx to suggest a viral etiology, pt denied any other symptoms: diarrhea, joint pain, rash, CP, dysuria, only complaint was malaise and SOB    11/26 BCX NG, 11/27 BCX NG    HIV neg  #Transaminitis with now rising Tbili  #HFrEF EF 30-35%  #Lactate Dehydrogenase, Serum: 1424, Ferritin, Serum: 3597  #Anemia borderline macrocytic, stable   #Lactic acidosis    RECOMMENDATIONS  - doxy 100mg q12h IV  - send another parasite smear   - please ask lab how to send yellow fever serology  - send trypanosoma testing, Toxo IgM, dengue Ab, leptospira ab, mycoplasma igm/igg, coxsackie Abs   - f/u coxiella Ab, Hep E IgM/IgG, omid mountain ab (rickettsia), plasmodium PCR, quantiferon gold   - Trend LFTs  - Will call [facility] hotline to see if fits with mefloquine-associated pericarditis. Per residents called hotline with no response.  - on steroids  - Elevated ferritin likely reactive, r/o HLH, consider Heme Onc Consult send IL-2, NK cell activity     Spectra [id number] ASSESSMENT  62 yo M with HTN, recent travel to [place] 10/14-11/18 on mefloquine ppx that was started prior to travel (visited [facility], [place] in [facility]), admitted with fever, weakness, SOB, found to have ST elevations, trop of 10 , s/p normal cath     IMPRESSION  #Intubated   #Elevated Trop/ST elevations with pericardial effusion c/w myopericarditis and transaminitis , now CXR with ARDS/volume overload    ?viral ?inflammatory    Possibly a side effect of mefloquine as can cause pericarditis and other cardiac side effects, would be rare     R/o Yellow fever (no vaccine), Q fever, Hep E, rickettsial disease, chagas, leptospira, dengue, other viral etiology . Unlikely typhoid as BCX NG x2. Lower suspicion for TB as acute picture    While initially concerning for severe malaria, parasite smear is negative and mefloquine ppx should have been sufficient & s/p malarone as outpatient (no malaria testing done as outpatient)    No URI sx to suggest a viral etiology, pt denied any other symptoms: diarrhea, joint pain, rash, CP, dysuria, only complaint was malaise and SOB    11/26 BCX NG, 11/27 BCX NG    HIV neg  #Transaminitis with now rising Tbili  #HFrEF EF 30-35%  #Lactate Dehydrogenase, Serum: 1424, Ferritin, Serum: 3597  #Anemia borderline macrocytic, stable   #Lactic acidosis    RECOMMENDATIONS  - doxy 100mg q12h IV  - send another parasite smear   - please ask lab how to send yellow fever serology  - send trypanosoma testing, Toxo IgM, dengue Ab, leptospira ab, mycoplasma igm/igg, coxsackie Abs   - f/u coxiella Ab, Hep E IgM/IgG, omid mountain ab (rickettsia), plasmodium PCR, quantiferon gold   - Trend LFTs  - Discussed CDC hotline to see if fits with mefloquine-associated pericarditis. Per residents called hotline with no response.  - on steroids  - Elevated ferritin likely reactive, r/o HLH, consider Heme Onc Consult send IL-2, NK cell activity   - Recommend endomysial biopsy - referral to outside center  - ?pericardiocentesis : would send for cell count, bacterial cx, fungal cx, AFB   - ?ECHO     Spectra [phone]

## 2019-11-29 NOTE — CONSULT NOTE ADULT - CONSULT REQUESTED DATE/TIME
26-Nov-2019 09:42
26-Nov-2019 11:00
27-Nov-2019 10:07
27-Nov-2019 10:54
29-Nov-2019 07:24
29-Nov-2019 08:17
29-Nov-2019 09:34
29-Nov-2019 10:58
29-Nov-2019 20:11

## 2019-11-29 NOTE — PROGRESS NOTE ADULT - ATTENDING COMMENTS
pt developed resp distress w/ intubation in am w/ period of pea w/ response to pressors. pt w/ sg placed. improved hemo on pressors, but worse lv fn on echo. plan for transfer to [facility] for further care as per hf attending dr shah. etio still in question w/ id studies in progress.

## 2019-11-29 NOTE — PROGRESS NOTE ADULT - ATTENDING COMMENTS
pt was a code blue this AM and subsequently intubated and started on pressor support. ICU was consulted to continue care. pt seen and examined at 1915. currently intubated, on levophed, and fentanyl drip. pt not responsive to even painful stimuli.    gen: intubated and sedated  HEENT: PERRLA but sluggish  CV: RRR no murmurs rubs or gallops  lungs: coarse breath sounds diffusely   abd: soft NTND +bowel sounds throughout  neuro/ext: sedated unable to assess    #Weakness, cyclical fevers, Dyspnea -r/o myopericarditis of infectious etiology  S/P Emergent LHC- Normal coronaries.   Code blue called this morning 11/29/19. ROSC achieved.   Peripheral parasite blood smear neg x2  hemolysis panel suggestive of DIC  - ID onboard appreciate recommendations   - cont Ceftriaxone 2g q24h IV  -cont  Doxycycline 100mg IV q12  -consider d/cing Vancomycin 1000mg q12  - F/u GI PCR, Hep B+C PCR, HIV PCR, tick PCR, plasmodium PCR   - F/u Chuy mountain spotted fever IgG, Quantiferon plus TB, Q fever antibody, Hep E IgG/IgE, trypanosoma cruzzi Ab, mycoplasma IgG/IgM, haptoglobin, coxsackie A/B Ab  - Repeat ECHO  -cont  mg PO daily and Hydrocortisone 100 mg IV q8hrs  -CCU/ICU planning for transfer to [facility] for further care; agree with plan to transfer      #cardiac arrest with subsequent ROSC  s/p intubation   now requiring pressor support  -transfer to ICU service and agree with remainder to medical mgmt as deemed fit per ICU team  -spoke with ICU attending who will continue care with plan to arrange transfer to outside hospital today vs. tomorrow     #Acute transaminitis- worsening  shock liver   -f/u GI eval   -consider starting NAC protocol     #lactic acidosis- worsening post cardiac arrest  -cont to trend    #Acute Rhabdomyolysis  - trend daily CK  - would hold off on IVF due to worsening pulmonary congestion    #Anemia  -f/u anemia panel  -hemolysis panel suggestive of DIC  -f/u heme     # elevated LDH due to acute inflammatory state  - continue daily monitoring with above tx    Progress Note Handoff  Pending:  transfer to [facility] per CCU/ICU plan  Patient/Family discussion: pt sedated and intubated residents have spoken to family who are aware of plan to transfer care  Disposition: [facility] pt was a code blue this AM and subsequently intubated and started on pressor support. ICU was consulted to continue care. pt seen and examined at 1915. currently intubated, on levophed, and fentanyl drip. pt not responsive to even painful stimuli.    gen: intubated and sedated  HEENT: PERRLA but sluggish  CV: RRR no murmurs rubs or gallops  lungs: coarse breath sounds diffusely   abd: soft NTND +bowel sounds throughout  neuro/ext: sedated unable to assess    #Weakness, cyclical fevers, Dyspnea -r/o myopericarditis of infectious etiology  S/P Emergent LHC- Normal coronaries.   Code blue called this morning 11/29/19. ROSC achieved.   Peripheral parasite blood smear neg x2  hemolysis panel suggestive of DIC  - ID onboard appreciate recommendations   - cont Ceftriaxone 2g q24h IV  -cont  Doxycycline 100mg IV q12  -consider d/cing Vancomycin 1000mg q12  - F/u GI PCR, Hep B+C PCR, HIV PCR, tick PCR, plasmodium PCR   - F/u Chuy mountain spotted fever IgG, Quantiferon plus TB, Q fever antibody, Hep E IgG/IgE, trypanosoma cruzzi Ab, mycoplasma IgG/IgM, haptoglobin, coxsackie A/B Ab  - Repeat ECHO  -cont  mg PO daily and Hydrocortisone 100 mg IV q8hrs  -CCU/ICU planning for transfer to [facility] for further care; agree with plan to transfer    -appreciate  recs from cardiology, EP, heart failure, ICU, CTsx, and heme/onc    #cardiac arrest with subsequent ROSC  s/p intubation   now requiring pressor support  -transfer to ICU service and agree with remainder to medical mgmt as deemed fit per ICU team  -spoke with ICU attending who will continue care with plan to arrange transfer to outside hospital today vs. tomorrow     #Acute transaminitis- worsening  shock liver   -f/u GI eval   -consider starting NAC protocol     #lactic acidosis- worsening post cardiac arrest  -cont to trend    #Acute Rhabdomyolysis  - trend daily CK  - would hold off on IVF due to worsening pulmonary congestion    #Anemia  -f/u anemia panel  -hemolysis panel suggestive of DIC  -f/u heme     # elevated LDH due to acute inflammatory state  - continue daily monitoring with above tx    Progress Note Handoff  Pending:  transfer to [facility] per CCU/ICU plan  Patient/Family discussion: pt sedated and intubated residents have spoken to family who are aware of plan to transfer care  Disposition: [facility] pt was a code blue this AM and subsequently intubated and started on pressor support. ICU was consulted to continue care. pt seen and examined at 1915. currently intubated, on levophed, and fentanyl drip. pt not responsive to even painful stimuli.    gen: intubated and sedated  HEENT: PERRLA but sluggish  CV: RRR no murmurs rubs or gallops  lungs: coarse breath sounds diffusely   abd: soft NTND +bowel sounds throughout  neuro/ext: sedated unable to assess    #Weakness, cyclical fevers, Dyspnea -r/o myopericarditis of infectious etiology  S/P Emergent LHC- Normal coronaries.   Code blue called this morning 11/29/19. ROSC achieved.   Peripheral parasite blood smear neg x2  hemolysis panel suggestive of DIC  - ID onboard appreciate recommendations   - cont Ceftriaxone 2g q24h IV  -cont  Doxycycline 100mg IV q12  -consider d/cing Vancomycin 1000mg q12  - F/u GI PCR, Hep B+C PCR, HIV PCR, tick PCR, plasmodium PCR   - F/u Chuy mountain spotted fever IgG, Quantiferon plus TB, Q fever antibody, Hep E IgG/IgE, trypanosoma cruzzi Ab, mycoplasma IgG/IgM, haptoglobin, coxsackie A/B Ab  - Repeat ECHO  -cont  mg PO daily and Hydrocortisone 100 mg IV q8hrs  -CCU/ICU planning for transfer to [facility] for further care; agree with plan to transfer    -appreciate recs from cardiology, EP, heart failure, ICU, CTsx, and heme/onc    #cardiac arrest with subsequent ROSC  s/p intubation   now requiring pressor support  -transfer to ICU service (pt intubated and on pressor support)   -continue the remainder to medical mgmt as per ICU team  -spoke with ICU attending who will continue care with plan to arrange transfer to outside hospital today vs. tomorrow     #Acute transaminitis- worsening  shock liver   -f/u GI eval   -consider starting NAC protocol     #lactic acidosis- worsening post cardiac arrest  -cont to trend    #Acute Rhabdomyolysis  - trend daily CK  - would hold off on IVF due to worsening pulmonary congestion    #Anemia  -f/u anemia panel  -transfusing 1 unit at this time per ICU attending   -hemolysis panel suggestive of DIC  -f/u heme     # elevated LDH due to acute inflammatory state  - continue daily monitoring with above tx    Progress Note Handoff  Pending:  transfer to [facility] per CCU/ICU plan  Patient/Family discussion: pt sedated and intubated residents have spoken to family who are aware of plan to transfer care  Disposition: [facility]

## 2019-11-29 NOTE — PROCEDURE NOTE - SUPERVISORY STATEMENT
General Thoracic Surgery Attestation    I have seen and examined the patient.  Where appropriate I have updated, edited, or corrected the resident's or PA's note with regard to findings, values, and plan.

## 2019-11-29 NOTE — PROGRESS NOTE ADULT - REASON FOR ADMISSION
US completed DCW  
SOB, fever, weakness

## 2019-11-29 NOTE — PROGRESS NOTE ADULT - ASSESSMENT
64 y/o M with h/o HTN admitted with c/o SOB, fever, chills found to have myopericarditis. LHC showed clean coronaries. Patient is hemodynamically stable.

## 2019-11-29 NOTE — PROGRESS NOTE ADULT - PROBLEM SELECTOR PLAN 4
Unknown etiology, I suspect infectious process with heart involvement  He got one dose of solumedrol 500 mg ivp yesterday empirically   If no improvement, might need to consider Endomyocardial biopsy

## 2019-11-29 NOTE — CONSULT NOTE ADULT - ATTENDING COMMENTS
62 yo male with PMHx: htn, malaria (Recently diagnosed on ATovaquin/Proguanil), p/w SOB/fever and weakness, EKG changes, s/p code STEMI, with negative cors. Patient with rising LFTs, acidosis, hypoxia, s/p cardiac arrest this morning  CT surgery was asked to evaluate patient for ECMO.  spont circ returned  RHC this morning revealed PAs 50/21 (wedge of ~20)  CO - 4L/min by [place]  SVO2 of 50%, pt acidotic 7.16 with PCO2 of 49  pt on peep of 5, FIO2 of 100%, high lactate  fluffy infiltrates on CXR  ARDS vs Pulm edema or combined process  adjust vent settings to address respiratory acidosis  case was d/w Dr. Elaine, Dr. Shankar, Dr. Puente  pt is not in need of ecmo now  but probably will end up requiring cardiopulmonary support for myocarditits vs systemic inflammatory process due to an infectious (probably viral) infection
PT is elevated 2/2 likely low degree of DIC s/p cardiac arrest and resuscitation, also possible vitamin K deficiency and liver disfunction.   Hb remains stable, no overt hemolysis.   Smear was reviewed no overt immature forms, no schistocytes.   Repeat labwork. Treat underlying cause.
Pt seen and examined  above note reviwed  agree w/ above   Lactic acidosis post cardiac arrest  UOP improved w/ lasix  lactate improving on repeat    agree w/ above plan no need for RRT  trend Cr and lactate pH   monitor UOP
Critically ill patient  Acute Myopericarditis  Cardiomyopathy  Frequent PVCs    -Agree with above recommendations  -Cardiac MRI to assess extent of acute myocarditis and LGE  -check serologies for Coxsackie B, EBV, toxoplasma gondii and trypanosoma  -Trend ESR, CRP, cardiac enzymes  -Daily ECG  -ID consult/Heart failure reviewed
pt w/ cath w/o obstructive cad. id to see pt. check echo. current status consistent with myopericarditis in setting of treatment for malaria. monitor in unit.

## 2019-11-30 LAB
CULTURE RESULTS: SIGNIFICANT CHANGE UP
HAPTOGLOB SERPL-MCNC: 159 MG/DL — SIGNIFICANT CHANGE UP (ref 34–200)
SPECIMEN SOURCE: SIGNIFICANT CHANGE UP

## 2019-12-01 LAB
G6PD RBC-CCNC: 6.6 U/G HGB — LOW (ref 7–20.5)
GAMMA INTERFERON BACKGROUND BLD IA-ACNC: 0.01 IU/ML — SIGNIFICANT CHANGE UP
GAMMA INTERFERON BACKGROUND BLD IA-ACNC: 0.01 IU/ML — SIGNIFICANT CHANGE UP
M TB IFN-G BLD-IMP: ABNORMAL
M TB IFN-G BLD-IMP: ABNORMAL
M TB IFN-G CD4+ BCKGRND COR BLD-ACNC: 0 IU/ML — SIGNIFICANT CHANGE UP
M TB IFN-G CD4+ BCKGRND COR BLD-ACNC: 0 IU/ML — SIGNIFICANT CHANGE UP
M TB IFN-G CD4+CD8+ BCKGRND COR BLD-ACNC: 0 IU/ML — SIGNIFICANT CHANGE UP
M TB IFN-G CD4+CD8+ BCKGRND COR BLD-ACNC: 0.05 IU/ML — SIGNIFICANT CHANGE UP
QUANT TB PLUS MITOGEN MINUS NIL: 0 IU/ML — SIGNIFICANT CHANGE UP
QUANT TB PLUS MITOGEN MINUS NIL: 0.04 IU/ML — SIGNIFICANT CHANGE UP

## 2019-12-02 LAB
CULTURE RESULTS: SIGNIFICANT CHANGE UP
FSP PPP-MCNC: >=20 UG/ML
LEPTOSPIRA AB TITR SER: NEGATIVE — SIGNIFICANT CHANGE UP
R RICKETTSI AB SER-ACNC: NEGATIVE — SIGNIFICANT CHANGE UP
R RICKETTSI AB SER-ACNC: NEGATIVE — SIGNIFICANT CHANGE UP
R RICKETTSI IGM SER-ACNC: 0.58 INDEX — SIGNIFICANT CHANGE UP (ref 0–0.89)
R RICKETTSI IGM SER-ACNC: 0.71 INDEX — SIGNIFICANT CHANGE UP (ref 0–0.89)
RICK SF IGG TITR SER IF: NEGATIVE — SIGNIFICANT CHANGE UP
RICK SF IGG TITR SER IF: NEGATIVE — SIGNIFICANT CHANGE UP
RICK SF IGM TITR SER IF: 0.58 INDEX — SIGNIFICANT CHANGE UP (ref 0–0.89)
RICK SF IGM TITR SER IF: 0.71 INDEX — SIGNIFICANT CHANGE UP (ref 0–0.89)
SPECIMEN SOURCE: SIGNIFICANT CHANGE UP

## 2019-12-03 LAB
CK MB BLD-MCNC: HIGH TITER
COXSACKIE TYPE A-24: NEGATIVE TITER — SIGNIFICANT CHANGE UP
CV A24 IGG TITR SER IF: NEGATIVE TITER — SIGNIFICANT CHANGE UP
CV A7 AB SER-ACNC: HIGH TITER
CV A9 AB TITR FLD: HIGH TITER
CV B1 AB TITR FLD: NEGATIVE — SIGNIFICANT CHANGE UP
CV B2 AB TITR FLD: NEGATIVE — SIGNIFICANT CHANGE UP
CV B3 AB TITR FLD: NEGATIVE — SIGNIFICANT CHANGE UP
CV B4 AB TITR FLD: NEGATIVE — SIGNIFICANT CHANGE UP
CV B5 AB TITR FLD: NEGATIVE — SIGNIFICANT CHANGE UP
CV B6 AB TITR FLD: NEGATIVE — SIGNIFICANT CHANGE UP
GAMMA INTERFERON BACKGROUND BLD IA-ACNC: 0.12 IU/ML — SIGNIFICANT CHANGE UP
M TB IFN-G BLD-IMP: ABNORMAL
M TB IFN-G CD4+ BCKGRND COR BLD-ACNC: -0.08 IU/ML — SIGNIFICANT CHANGE UP
M TB IFN-G CD4+CD8+ BCKGRND COR BLD-ACNC: -0.11 IU/ML — SIGNIFICANT CHANGE UP
PLASMODIUM DNA BLD QL NAA+NON-PROBE: NEGATIVE — SIGNIFICANT CHANGE UP
QUANT TB PLUS MITOGEN MINUS NIL: -0.11 IU/ML — SIGNIFICANT CHANGE UP

## 2019-12-04 DIAGNOSIS — E87.2 ACIDOSIS: ICD-10-CM

## 2019-12-04 DIAGNOSIS — I27.20 PULMONARY HYPERTENSION, UNSPECIFIED: ICD-10-CM

## 2019-12-04 DIAGNOSIS — I11.0 HYPERTENSIVE HEART DISEASE WITH HEART FAILURE: ICD-10-CM

## 2019-12-04 DIAGNOSIS — R06.02 SHORTNESS OF BREATH: ICD-10-CM

## 2019-12-04 DIAGNOSIS — M62.82 RHABDOMYOLYSIS: ICD-10-CM

## 2019-12-04 DIAGNOSIS — I42.8 OTHER CARDIOMYOPATHIES: ICD-10-CM

## 2019-12-04 DIAGNOSIS — I50.21 ACUTE SYSTOLIC (CONGESTIVE) HEART FAILURE: ICD-10-CM

## 2019-12-04 DIAGNOSIS — I21.19 ST ELEVATION (STEMI) MYOCARDIAL INFARCTION INVOLVING OTHER CORONARY ARTERY OF INFERIOR WALL: ICD-10-CM

## 2019-12-04 DIAGNOSIS — E87.1 HYPO-OSMOLALITY AND HYPONATREMIA: ICD-10-CM

## 2019-12-04 DIAGNOSIS — E83.41 HYPERMAGNESEMIA: ICD-10-CM

## 2019-12-04 DIAGNOSIS — D65 DISSEMINATED INTRAVASCULAR COAGULATION [DEFIBRINATION SYNDROME]: ICD-10-CM

## 2019-12-04 DIAGNOSIS — I08.2 RHEUMATIC DISORDERS OF BOTH AORTIC AND TRICUSPID VALVES: ICD-10-CM

## 2019-12-04 DIAGNOSIS — I30.9 ACUTE PERICARDITIS, UNSPECIFIED: ICD-10-CM

## 2019-12-04 DIAGNOSIS — E87.4 MIXED DISORDER OF ACID-BASE BALANCE: ICD-10-CM

## 2019-12-04 DIAGNOSIS — D53.9 NUTRITIONAL ANEMIA, UNSPECIFIED: ICD-10-CM

## 2019-12-04 DIAGNOSIS — R65.10 SYSTEMIC INFLAMMATORY RESPONSE SYNDROME (SIRS) OF NON-INFECTIOUS ORIGIN WITHOUT ACUTE ORGAN DYSFUNCTION: ICD-10-CM

## 2019-12-04 DIAGNOSIS — N17.9 ACUTE KIDNEY FAILURE, UNSPECIFIED: ICD-10-CM

## 2019-12-04 DIAGNOSIS — R57.0 CARDIOGENIC SHOCK: ICD-10-CM

## 2019-12-04 DIAGNOSIS — J96.01 ACUTE RESPIRATORY FAILURE WITH HYPOXIA: ICD-10-CM

## 2019-12-04 DIAGNOSIS — R74.0 NONSPECIFIC ELEVATION OF LEVELS OF TRANSAMINASE AND LACTIC ACID DEHYDROGENASE [LDH]: ICD-10-CM

## 2019-12-04 DIAGNOSIS — E83.51 HYPOCALCEMIA: ICD-10-CM

## 2019-12-04 LAB
HEV AB FLD QL: NEGATIVE — SIGNIFICANT CHANGE UP
M PNEUMO IGG SER IA-ACNC: 0.68 INDEX — SIGNIFICANT CHANGE UP
M PNEUMO IGG SER IA-ACNC: NEGATIVE — SIGNIFICANT CHANGE UP
M PNEUMO IGM SER-ACNC: 61 UNITS/ML — SIGNIFICANT CHANGE UP
MYCOPLASMA AG SPEC QL: NEGATIVE — SIGNIFICANT CHANGE UP
PLASMODIUM DNA BLD QL NAA+NON-PROBE: NEGATIVE — SIGNIFICANT CHANGE UP
T CRUZI AB SER-ACNC: SIGNIFICANT CHANGE UP

## 2019-12-06 LAB
HEV IGM SER QL: SIGNIFICANT CHANGE UP
HEV IGM SER QL: SIGNIFICANT CHANGE UP

## 2023-12-08 NOTE — PATIENT PROFILE ADULT - NSPROEDAREADYLEARN_GEN_A_NUR
Two patient Identification confirmed. Patient notified about upcoming appointment location. [address]  [address] [facility], [facility] [address]. Patient confirmed he is familiar with location. Patient aware medication and referral will be discussed at appointment time.   Patient verbalized understanding
none